# Patient Record
Sex: FEMALE | Race: WHITE | NOT HISPANIC OR LATINO | Employment: OTHER | URBAN - METROPOLITAN AREA
[De-identification: names, ages, dates, MRNs, and addresses within clinical notes are randomized per-mention and may not be internally consistent; named-entity substitution may affect disease eponyms.]

---

## 2017-01-13 ENCOUNTER — APPOINTMENT (OUTPATIENT)
Dept: PHYSICAL THERAPY | Facility: REHABILITATION | Age: 62
End: 2017-01-13
Payer: COMMERCIAL

## 2017-01-24 ENCOUNTER — HOSPITAL ENCOUNTER (OUTPATIENT)
Dept: RADIOLOGY | Facility: MEDICAL CENTER | Age: 62
End: 2017-01-24
Attending: FAMILY MEDICINE
Payer: COMMERCIAL

## 2017-01-24 DIAGNOSIS — M25.562 LEFT KNEE PAIN, UNSPECIFIED CHRONICITY: ICD-10-CM

## 2017-01-24 DIAGNOSIS — S83.412A SPRAIN OF MEDIAL COLLATERAL LIGAMENT OF LEFT KNEE, INITIAL ENCOUNTER: ICD-10-CM

## 2017-01-24 PROCEDURE — 73721 MRI JNT OF LWR EXTRE W/O DYE: CPT | Mod: LT

## 2017-02-16 ENCOUNTER — OFFICE VISIT (OUTPATIENT)
Dept: NEUROLOGY | Facility: MEDICAL CENTER | Age: 62
End: 2017-02-16
Payer: COMMERCIAL

## 2017-02-16 VITALS
SYSTOLIC BLOOD PRESSURE: 110 MMHG | OXYGEN SATURATION: 91 % | BODY MASS INDEX: 34.06 KG/M2 | HEIGHT: 67 IN | DIASTOLIC BLOOD PRESSURE: 76 MMHG | HEART RATE: 79 BPM | WEIGHT: 217 LBS | TEMPERATURE: 99 F

## 2017-02-16 DIAGNOSIS — G35 MS (MULTIPLE SCLEROSIS) (HCC): ICD-10-CM

## 2017-02-16 DIAGNOSIS — M54.50 LOW BACK PAIN ASSOCIATED WITH A SPINAL DISORDER OTHER THAN RADICULOPATHY OR SPINAL STENOSIS: ICD-10-CM

## 2017-02-16 PROCEDURE — 99213 OFFICE O/P EST LOW 20 MIN: CPT

## 2017-02-16 RX ORDER — TRAMADOL HYDROCHLORIDE 50 MG/1
50 TABLET ORAL EVERY 4 HOURS PRN
COMMUNITY
End: 2018-01-16

## 2017-02-16 NOTE — PROGRESS NOTES
Neurology Progress Note  2/16/2017      Referring MD:  Dr. Jacobo  Patient ID:  Name:             Bharti Zabala   YOB: 1955  Age:                 62 y.o.  female   MRN:               [id number]                                              Reason for Consult:      Follow-up of multiple sclerosis and low back pain    History of Present Illness:    This 62-year-old lady is stable multiple sclerosis with 40 mg Copaxone every third day and has just had some knee injections because of pain in the left knee. She is scheduled to have an epidural steroid on March 3 after appropriate clearance by her endocrinologist and other specialists. She's not had any new symptoms from her multiple sclerosis and is tolerating the Copaxone quite well.    Review of Systems: Relates to her low back pain primarily.  Constitutional: Denies fevers, Denies weight changes  Eyes: Denies changes in vision, no eye pain  Ears/Nose/Throat/Mouth: Denies nasal congestion or sore throat   Cardiovascular: Denies chest pain, Denies palpitations   Respiratory: Denies shortness of breath , Denies cough  Gastrointestinal/Hepatic: Denies abdominal pain, nausea, vomiting, diarrhea, constipation or GI bleeding   Genitourinary: Denies dysuria or frequency  Musculoskeletal/Rheum: Denies  joint pain and swelling, No edema  Skin: Denies rash  Neurological: Denies headache, confusion, memory loss or focal weakness/parasthesias  Psychiatric: denies mood disorder   Endocrine: Argelia thyroid problems  Heme/Oncology/Lymph Nodes: Denies enlarged lymph nodes, denies brusing or known bleeding disorder  All other systems were reviewed and are negative (AMA/CMS criteria)                Past Medical History:     Past Medical History   Diagnosis Date   • MS (multiple sclerosis) (CMS-Former[facility])        Problems addressed this visit:    Problem List Items Addressed This Visit     MS (multiple sclerosis) (CMS-Formerly [facility])      Other Visit Diagnoses     Low back pain  "associated with a spinal disorder other than radiculopathy or spinal stenosis         Relevant Medications     tramadol (ULTRAM) 50 MG Tab           Past Surgical History:   Past Surgical History   Procedure Laterality Date   • Pr cholecystoenterostomy           Current Outpatient Medications:  Current Outpatient Prescriptions   Medication   • tramadol (ULTRAM) 50 MG Tab   • Cranberry 450 MG Cap   • methenamine hip (HIPPREX) 1 GM Tab   • Glatiramer Acetate (COPAXONE) 40 MG/ML Solution Prefilled Syringe   • oxybutynin SR (DITROPAN-XL) 5 MG TB24   • atorvastatin (LIPITOR) 20 MG TABS   • predniSONE (DELTASONE) 10 MG TABS   • vitamin D (CHOLECALCIFEROL) 1000 UNIT TABS   • VITAMIN K PO   • Vitamin D-Vitamin K (K2 PLUS D3) 100-1000 MCG-UNIT Tab   • amoxicillin (AMOXIL) 250 MG Cap   • tetracycline (ACHROMYCIN, SUMYCIN) 500 MG Cap   • cyanocobalamin (VITAMIN B-12) 1000 MCG/ML SOLN   • POTASSIUM PO   • glatiramer (COPAXONE) 20 MG/ML KIT   • nitrofurantoin monohydr macro (MACROBID) 100 MG CAPS     No current facility-administered medications for this visit.       Medication Allergy:  Allergies   Allergen Reactions   • Sulfa Drugs Rash             Family History:  Family History   Problem Relation Age of Onset   • Cancer Other        Social History:  Social History     Social History   • Marital Status:      Spouse Name: N/A   • Number of Children: N/A   • Years of Education: N/A     Occupational History   • Not on file.     Social History Main Topics   • Smoking status: Never Smoker    • Smokeless tobacco: Not on file   • Alcohol Use: No   • Drug Use: No   • Sexual Activity: Not on file     Other Topics Concern   • Not on file     Social History Narrative       Physical Exam:  Vitals:   Filed Vitals:    02/16/17 1252   BP: 110/76   Pulse: 79   Temp: 37.2 °C (99 °F)   Height: 1.702 m (5' 7.01\")   Weight: 98.431 kg (217 lb)   SpO2: 91%     General Appearance: She is well-developed well-nourished lady. She is to walk and " carefully because of a knee brace on the left knee but otherwise her strength is fairly good and she is some decreased pinprick in the left body side which is been a stable finding from her multiple sclerosis.  Weight/BMI: Body mass index is 33.98 kg/(m^2).      Eyes:  Normal optic discs: Yes  Visual field: Normal  Pupillary responses: Normal  Extraocular movement: Normal    Cardiovascular:  Normal carotid pulses bilaterally: Yes  RRR with no MRGs: Yes  No peripheral edema, pulses intact: Yes    Musculoskeletal:  Normal gait and station: Yes  Normal muscle strength: Yes  Normal muscle tone, no atrophy: Yes  No abnormal movements: Yes    Plan:   Since her MS is relatively stable and back pain is going to be dealt with by physiatry and possibly spine surgery after the effect of the epidural is known I will plan on seeing her in 6 months. His free to call if any thing else is needed before that time.    Total length of time of this visit was 20 minutes of which greater than 50% was spent counseling the patient/family and coordinating care.    Thank you for allowing me to participate in his care.    Sincerely yours,        Silas Wang MD, PhD

## 2017-02-16 NOTE — MR AVS SNAPSHOT
"        Bharti Zabala   2017 1:00 PM   Office Visit   MRN: [id number]    Department:  [facility]   Dept Phone:  [phone]    Description:  Female : 1955   Provider:  Silas Wang M.D.           Reason for Visit     Follow-Up back pain associated with peripheral numbness      Allergies as of 2017     Allergen Noted Reactions    Sulfa Drugs 2014   Rash           You were diagnosed with     MS (multiple sclerosis) (CMS-HCC)   [906743]       Low back pain associated with a spinal disorder other than radiculopathy or spinal stenosis   [1381233]         Vital Signs     Blood Pressure Pulse Temperature Height Weight Body Mass Index    110/76 mmHg 79 37.2 °C (99 °F) 1.702 m (5' 7.01\") 98.431 kg (217 lb) 33.98 kg/m2    Oxygen Saturation Smoking Status                91% Never Smoker           Basic Information     Date Of Birth Sex Race Ethnicity Preferred Language    1955 Female White Non- English      Problem List              ICD-10-CM Priority Class Noted - Resolved    MS (multiple sclerosis) (CMS-HCC) G35   10/22/2012 - Present    Lower urinary tract infectious disease N39.0   10/22/2012 - Present    HYPOTENSION    10/23/2012 - Present    Flu syndrome J11.1   2014 - Present      Health Maintenance        Date Due Completion Dates    IMM DTaP/Tdap/Td Vaccine (1 - Tdap) 1974 ---    PAP SMEAR 1976 ---    COLONOSCOPY 2005 ---    IMM ZOSTER VACCINE 2015 ---    IMM INFLUENZA (1) 2016 ---    MAMMOGRAM 2017, 2015, 2014, 2013, 2011, 10/3/2008, 10/3/2008, 2007, 2007            Current Immunizations     No immunizations on file.      Below and/or attached are the medications your provider expects you to take. Review all of your home medications and newly ordered medications with your provider and/or pharmacist. Follow medication instructions as directed by your provider and/or pharmacist. Please keep your " medication list with you and share with your provider. Update the information when medications are discontinued, doses are changed, or new medications (including over-the-counter products) are added; and carry medication information at all times in the event of emergency situations     Allergies:  SULFA DRUGS - Rash               Medications  Valid as of: February 16, 2017 -  1:14 PM    Generic Name Brand Name Tablet Size Instructions for use    Amoxicillin (Cap) AMOXIL 250 MG         Atorvastatin Calcium (Tab) LIPITOR 20 MG Take 20 mg by mouth every evening.        Cholecalciferol (Tab) cholecalciferol 1000 UNIT Take 5,000 Units by mouth every bedtime.        Cranberry (Cap) Cranberry 450 MG Take  by mouth.        Cyanocobalamin (Solution) VITAMIN B-12 1000 MCG/ML 1,000 mcg by Intramuscular route every 30 days.        Glatiramer Acetate (Kit) COPAXONE 20 MG/ML Inject 40 mg as instructed every Monday, Wednesday, and Friday.        Glatiramer Acetate (Solution Prefilled Syringe) Glatiramer Acetate 40 MG/ML Inject 40 mg as instructed every Monday, Wednesday, and Friday.        Methenamine Hippurate (Tab) HIPPREX 1 GM Take 1 g by mouth 2 Times a Day.        Nitrofurantoin Monohyd Macro (Cap) MACROBID 100 MG Take 100 mg by mouth every day.        Oxybutynin Chloride (TABLET SR 24 HR) DITROPAN-XL 5 MG Take 5 mg by mouth every morning.        Potassium   Take 1 Tab by mouth every day. Indications: **OTC**        PredniSONE (Tab) DELTASONE 10 MG Take 10 mg by mouth every day. Take with food         Tetracycline HCl (Cap) ACHROMYCIN, SUMYCIN 500 MG         TraMADol HCl (Tab) ULTRAM 50 MG Take 50 mg by mouth every four hours as needed.        Vitamin D-Vitamin K (Tab) K2 PLUS D3 100-1000 MCG-UNIT Take  by mouth.        Vitamin K   Take 100 mg by mouth every day.        .                 Medicines prescribed today were sent to:     [facility], [place] - [address]    [address] [place] [address]    Phone: [phone] Fax: [phone]    Open 24 Hours?: No    [facility] - [place], [place] - [address]    [address] [address]    Phone: [phone] Fax: [phone]    Open 24 Hours?: No      Medication refill instructions:       If your prescription bottle indicates you have medication refills left, it is not necessary to call your provider’s office. Please contact your pharmacy and they will refill your medication.    If your prescription bottle indicates you do not have any refills left, you may request refills at any time through one of the following ways: The online [employer] system (except Urgent Care), by calling your provider’s office, or by asking your pharmacy to contact your provider’s office with a refill request. Medication refills are processed only during regular business hours and may not be available until the next business day. Your provider may request additional information or to have a follow-up visit with you prior to refilling your medication.   *Please Note: Medication refills are assigned a new Rx number when refilled electronically. Your pharmacy may indicate that no refills were authorized even though a new prescription for the same medication is available at the pharmacy. Please request the medicine by name with the pharmacy before contacting your provider for a refill.           [employer] Access Code: Activation code not generated  Current [employer] Status: Active

## 2017-02-23 ENCOUNTER — TELEPHONE (OUTPATIENT)
Dept: NEUROLOGY | Facility: MEDICAL CENTER | Age: 62
End: 2017-02-23

## 2017-02-23 NOTE — TELEPHONE ENCOUNTER
Pt called again, [facility] has number for Shaq at [facility] [phone].

## 2017-02-23 NOTE — TELEPHONE ENCOUNTER
Pt called in today stating that spine [facility] will not do the procedure you ordered for her unless you call and speak with them directly today.   The phone number at [facility] is [phone]       Please Advise    Thanks Trinh CUELLAR

## 2017-05-10 DIAGNOSIS — G35 MULTIPLE SCLEROSIS (HCC): ICD-10-CM

## 2017-05-10 RX ORDER — GLATIRAMER ACETATE 40 MG/ML
INJECTION, SOLUTION SUBCUTANEOUS
Qty: 12 ML | Refills: 12 | Status: SHIPPED | OUTPATIENT
Start: 2017-05-10 | End: 2019-02-27 | Stop reason: SDUPTHER

## 2017-05-19 DIAGNOSIS — Z01.812 PRE-OPERATIVE LABORATORY EXAMINATION: ICD-10-CM

## 2017-05-19 DIAGNOSIS — Z01.810 PRE-OPERATIVE CARDIOVASCULAR EXAMINATION: ICD-10-CM

## 2017-05-19 LAB
ANION GAP SERPL CALC-SCNC: 6 MMOL/L (ref 0–11.9)
APPEARANCE UR: ABNORMAL
BACTERIA #/AREA URNS HPF: ABNORMAL /HPF
BILIRUB UR QL STRIP.AUTO: NEGATIVE
BUN SERPL-MCNC: 17 MG/DL (ref 8–22)
CALCIUM SERPL-MCNC: 9.7 MG/DL (ref 8.5–10.5)
CAOX CRY #/AREA URNS HPF: ABNORMAL /HPF
CHLORIDE SERPL-SCNC: 105 MMOL/L (ref 96–112)
CO2 SERPL-SCNC: 29 MMOL/L (ref 20–33)
COLOR UR: YELLOW
CREAT SERPL-MCNC: 0.75 MG/DL (ref 0.5–1.4)
CULTURE IF INDICATED INDCX: YES UA CULTURE
EKG IMPRESSION: NORMAL
EPI CELLS #/AREA URNS HPF: ABNORMAL /HPF
ERYTHROCYTE [DISTWIDTH] IN BLOOD BY AUTOMATED COUNT: 45.4 FL (ref 35.9–50)
GFR SERPL CREATININE-BSD FRML MDRD: >60 ML/MIN/1.73 M 2
GLUCOSE SERPL-MCNC: 113 MG/DL (ref 65–99)
GLUCOSE UR STRIP.AUTO-MCNC: NEGATIVE MG/DL
HCT VFR BLD AUTO: 43.4 % (ref 37–47)
HGB BLD-MCNC: 14 G/DL (ref 12–16)
KETONES UR STRIP.AUTO-MCNC: NEGATIVE MG/DL
LEUKOCYTE ESTERASE UR QL STRIP.AUTO: NEGATIVE
MCH RBC QN AUTO: 28.9 PG (ref 27–33)
MCHC RBC AUTO-ENTMCNC: 32.3 G/DL (ref 33.6–35)
MCV RBC AUTO: 89.5 FL (ref 81.4–97.8)
MICRO URNS: ABNORMAL
MUCOUS THREADS #/AREA URNS HPF: ABNORMAL /HPF
NITRITE UR QL STRIP.AUTO: POSITIVE
PH UR STRIP.AUTO: 6 [PH]
PLATELET # BLD AUTO: 222 K/UL (ref 164–446)
PMV BLD AUTO: 11.3 FL (ref 9–12.9)
POTASSIUM SERPL-SCNC: 4.5 MMOL/L (ref 3.6–5.5)
PROT UR QL STRIP: NEGATIVE MG/DL
RBC # BLD AUTO: 4.85 M/UL (ref 4.2–5.4)
RBC # URNS HPF: ABNORMAL /HPF
RBC UR QL AUTO: NEGATIVE
SCCMEC + MECA PNL NOSE NAA+PROBE: NEGATIVE
SCCMEC + MECA PNL NOSE NAA+PROBE: NEGATIVE
SODIUM SERPL-SCNC: 140 MMOL/L (ref 135–145)
SP GR UR STRIP.AUTO: 1.02
WBC # BLD AUTO: 12.4 K/UL (ref 4.8–10.8)
WBC #/AREA URNS HPF: ABNORMAL /HPF

## 2017-05-19 PROCEDURE — 87641 MR-STAPH DNA AMP PROBE: CPT

## 2017-05-19 PROCEDURE — 87640 STAPH A DNA AMP PROBE: CPT

## 2017-05-19 PROCEDURE — 85027 COMPLETE CBC AUTOMATED: CPT

## 2017-05-19 PROCEDURE — 80048 BASIC METABOLIC PNL TOTAL CA: CPT

## 2017-05-19 PROCEDURE — 83036 HEMOGLOBIN GLYCOSYLATED A1C: CPT

## 2017-05-19 PROCEDURE — 36415 COLL VENOUS BLD VENIPUNCTURE: CPT

## 2017-05-19 PROCEDURE — 87186 SC STD MICRODIL/AGAR DIL: CPT

## 2017-05-19 PROCEDURE — 93005 ELECTROCARDIOGRAM TRACING: CPT

## 2017-05-19 PROCEDURE — 87077 CULTURE AEROBIC IDENTIFY: CPT

## 2017-05-19 PROCEDURE — 93010 ELECTROCARDIOGRAM REPORT: CPT | Performed by: INTERNAL MEDICINE

## 2017-05-19 PROCEDURE — 81001 URINALYSIS AUTO W/SCOPE: CPT

## 2017-05-19 PROCEDURE — 87086 URINE CULTURE/COLONY COUNT: CPT

## 2017-05-19 NOTE — DISCHARGE PLANNING
DISCHARGE PLANNING NOTE - TOTAL JOINT     Procedure: Procedure(s):  KNEE UNICOMPARTMENTAL - MEDIAL [place]  Procedure Date: 5/23/2017  Insurance:  Payor: [employer] / [employer] / Plan: [employer] / [employer] / Product Type: *No Product type* /   Equipment currently available at home? none at this time.  will get fww if needed  Steps into the home? 3  Steps within the home? 0  Toilet height? ADA  Type of shower? walk-in shower  Who will be with you during your recovery? spouse  Is Outpatient Physical Therapy set up after surgery? No   Did you take the Total Joint Class and where? No     Plan: Met pt in pre-admit.  Unicompartmental knee, with plans to dc home as outpt.  She has a pre-op apt on Monday 5/22 to set up PT and DME if needed.  She has a seat in shower.  No questions or DME needs at this time.

## 2017-05-19 NOTE — OR NURSING
Pre admit apt: Pt. Instructed to continue regularly prescribed medications through day before surgery.  Instructed to take the following medications, the day of surgery, with a sip of water per anesthesia protocol:methenamine, ultram, ditropan

## 2017-05-21 LAB
BACTERIA UR CULT: ABNORMAL
BACTERIA UR CULT: ABNORMAL
SIGNIFICANT IND 70042: ABNORMAL
SITE SITE: ABNORMAL
SOURCE SOURCE: ABNORMAL

## 2017-05-22 LAB
EST. AVERAGE GLUCOSE BLD GHB EST-MCNC: 123 MG/DL
HBA1C MFR BLD: 5.9 % (ref 0–5.6)

## 2017-05-23 ENCOUNTER — HOSPITAL ENCOUNTER (OUTPATIENT)
Facility: MEDICAL CENTER | Age: 62
End: 2017-05-23
Attending: ORTHOPAEDIC SURGERY | Admitting: ORTHOPAEDIC SURGERY
Payer: COMMERCIAL

## 2017-05-23 VITALS
TEMPERATURE: 97.2 F | SYSTOLIC BLOOD PRESSURE: 120 MMHG | OXYGEN SATURATION: 93 % | BODY MASS INDEX: 32.87 KG/M2 | DIASTOLIC BLOOD PRESSURE: 65 MMHG | HEART RATE: 75 BPM | HEIGHT: 67 IN | WEIGHT: 209.44 LBS | RESPIRATION RATE: 16 BRPM

## 2017-05-23 PROBLEM — M17.12 DEGENERATIVE ARTHRITIS OF LEFT KNEE: Status: ACTIVE | Noted: 2017-05-23

## 2017-05-23 LAB — GLUCOSE BLD-MCNC: 108 MG/DL (ref 65–99)

## 2017-05-23 PROCEDURE — G8980 MOBILITY D/C STATUS: HCPCS | Mod: CL

## 2017-05-23 PROCEDURE — 700105 HCHG RX REV CODE 258

## 2017-05-23 PROCEDURE — 700105 HCHG RX REV CODE 258: Performed by: ORTHOPAEDIC SURGERY

## 2017-05-23 PROCEDURE — 700111 HCHG RX REV CODE 636 W/ 250 OVERRIDE (IP)

## 2017-05-23 PROCEDURE — 500088 HCHG BLADE, SAGITTAL: Performed by: ORTHOPAEDIC SURGERY

## 2017-05-23 PROCEDURE — 160009 HCHG ANES TIME/MIN: Performed by: ORTHOPAEDIC SURGERY

## 2017-05-23 PROCEDURE — 160046 HCHG PACU - 1ST 60 MINS PHASE II: Performed by: ORTHOPAEDIC SURGERY

## 2017-05-23 PROCEDURE — 82962 GLUCOSE BLOOD TEST: CPT

## 2017-05-23 PROCEDURE — 501838 HCHG SUTURE GENERAL: Performed by: ORTHOPAEDIC SURGERY

## 2017-05-23 PROCEDURE — 500865 HCHG NEEDLE, SPINAL 20G/22G: Performed by: ORTHOPAEDIC SURGERY

## 2017-05-23 PROCEDURE — A9270 NON-COVERED ITEM OR SERVICE: HCPCS

## 2017-05-23 PROCEDURE — 500087 HCHG BLADE, RECIPROCATING: Performed by: ORTHOPAEDIC SURGERY

## 2017-05-23 PROCEDURE — 502240 HCHG MISC OR SUPPLY RC 0272: Performed by: ORTHOPAEDIC SURGERY

## 2017-05-23 PROCEDURE — 501486 HCHG STRYKER IRRIG SET HC W/TUBING: Performed by: ORTHOPAEDIC SURGERY

## 2017-05-23 PROCEDURE — 97161 PT EVAL LOW COMPLEX 20 MIN: CPT

## 2017-05-23 PROCEDURE — 160029 HCHG SURGERY MINUTES - 1ST 30 MINS LEVEL 4: Performed by: ORTHOPAEDIC SURGERY

## 2017-05-23 PROCEDURE — 700101 HCHG RX REV CODE 250

## 2017-05-23 PROCEDURE — 501480 HCHG STOCKINETTE: Performed by: ORTHOPAEDIC SURGERY

## 2017-05-23 PROCEDURE — 501487 HCHG STRYKER TIP: Performed by: ORTHOPAEDIC SURGERY

## 2017-05-23 PROCEDURE — 160041 HCHG SURGERY MINUTES - EA ADDL 1 MIN LEVEL 4: Performed by: ORTHOPAEDIC SURGERY

## 2017-05-23 PROCEDURE — G8979 MOBILITY GOAL STATUS: HCPCS | Mod: CL

## 2017-05-23 PROCEDURE — L1830 KO IMMOB CANVAS LONG PRE OTS: HCPCS | Performed by: ORTHOPAEDIC SURGERY

## 2017-05-23 PROCEDURE — 500093 HCHG BONE CEMENT MIXER: Performed by: ORTHOPAEDIC SURGERY

## 2017-05-23 PROCEDURE — 302135 SEQUENTIAL COMPRESSION MACHINE: Performed by: ORTHOPAEDIC SURGERY

## 2017-05-23 PROCEDURE — 500367 HCHG DRAIN KIT, HEMOVAC: Performed by: ORTHOPAEDIC SURGERY

## 2017-05-23 PROCEDURE — 502000 HCHG MISC OR IMPLANTS RC 0278: Performed by: ORTHOPAEDIC SURGERY

## 2017-05-23 PROCEDURE — 502579 HCHG PACK, TOTAL KNEE: Performed by: ORTHOPAEDIC SURGERY

## 2017-05-23 PROCEDURE — 160035 HCHG PACU - 1ST 60 MINS PHASE I: Performed by: ORTHOPAEDIC SURGERY

## 2017-05-23 PROCEDURE — G8978 MOBILITY CURRENT STATUS: HCPCS | Mod: CL

## 2017-05-23 PROCEDURE — 160048 HCHG OR STATISTICAL LEVEL 1-5: Performed by: ORTHOPAEDIC SURGERY

## 2017-05-23 PROCEDURE — 160002 HCHG RECOVERY MINUTES (STAT): Performed by: ORTHOPAEDIC SURGERY

## 2017-05-23 PROCEDURE — 160047 HCHG PACU  - EA ADDL 30 MINS PHASE II: Performed by: ORTHOPAEDIC SURGERY

## 2017-05-23 PROCEDURE — 500811 HCHG LENS/HOOD FOR SPACESUIT: Performed by: ORTHOPAEDIC SURGERY

## 2017-05-23 PROCEDURE — 700102 HCHG RX REV CODE 250 W/ 637 OVERRIDE(OP)

## 2017-05-23 PROCEDURE — 500423 HCHG DRESSING, ABD COMBINE: Performed by: ORTHOPAEDIC SURGERY

## 2017-05-23 PROCEDURE — 500096 HCHG BONE CEMENT, SIMPLEX ANTIBIOTIC: Performed by: ORTHOPAEDIC SURGERY

## 2017-05-23 PROCEDURE — 160036 HCHG PACU - EA ADDL 30 MINS PHASE I: Performed by: ORTHOPAEDIC SURGERY

## 2017-05-23 PROCEDURE — 160022 HCHG BLOCK: Performed by: ORTHOPAEDIC SURGERY

## 2017-05-23 PROCEDURE — 160025 RECOVERY II MINUTES (STATS): Performed by: ORTHOPAEDIC SURGERY

## 2017-05-23 DEVICE — CEMENT BONE SIMPLEX W/GENTAICIN (10/PK): Type: IMPLANTABLE DEVICE | Status: FUNCTIONAL

## 2017-05-23 DEVICE — IMPLANTABLE DEVICE: Type: IMPLANTABLE DEVICE | Status: FUNCTIONAL

## 2017-05-23 RX ORDER — MORPHINE SULFATE 10 MG/ML
INJECTION, SOLUTION INTRAMUSCULAR; INTRAVENOUS
Status: DISCONTINUED | OUTPATIENT
Start: 2017-05-23 | End: 2017-05-23 | Stop reason: HOSPADM

## 2017-05-23 RX ORDER — TRANEXAMIC ACID 100 MG/ML
INJECTION, SOLUTION INTRAVENOUS
Status: DISCONTINUED
Start: 2017-05-23 | End: 2017-05-23 | Stop reason: HOSPADM

## 2017-05-23 RX ORDER — LIDOCAINE HYDROCHLORIDE 10 MG/ML
INJECTION, SOLUTION INFILTRATION; PERINEURAL
Status: COMPLETED
Start: 2017-05-23 | End: 2017-05-23

## 2017-05-23 RX ORDER — CIPROFLOXACIN 500 MG/1
500 TABLET, FILM COATED ORAL 2 TIMES DAILY
COMMUNITY
End: 2019-01-17

## 2017-05-23 RX ORDER — BUPIVACAINE HYDROCHLORIDE AND EPINEPHRINE 5; 5 MG/ML; UG/ML
INJECTION, SOLUTION EPIDURAL; INTRACAUDAL; PERINEURAL
Status: DISCONTINUED | OUTPATIENT
Start: 2017-05-23 | End: 2017-05-23 | Stop reason: HOSPADM

## 2017-05-23 RX ORDER — OXYCODONE HCL 5 MG/5 ML
SOLUTION, ORAL ORAL
Status: COMPLETED
Start: 2017-05-23 | End: 2017-05-23

## 2017-05-23 RX ORDER — SODIUM CHLORIDE, SODIUM LACTATE, POTASSIUM CHLORIDE, CALCIUM CHLORIDE 600; 310; 30; 20 MG/100ML; MG/100ML; MG/100ML; MG/100ML
1000 INJECTION, SOLUTION INTRAVENOUS
Status: DISCONTINUED | OUTPATIENT
Start: 2017-05-23 | End: 2017-05-23 | Stop reason: HOSPADM

## 2017-05-23 RX ORDER — KETOROLAC TROMETHAMINE 30 MG/ML
INJECTION, SOLUTION INTRAMUSCULAR; INTRAVENOUS
Status: DISCONTINUED | OUTPATIENT
Start: 2017-05-23 | End: 2017-05-23 | Stop reason: HOSPADM

## 2017-05-23 RX ADMIN — VANCOMYCIN HYDROCHLORIDE 1 G: 1 INJECTION, POWDER, LYOPHILIZED, FOR SOLUTION INTRAVENOUS at 07:29

## 2017-05-23 RX ADMIN — SODIUM CHLORIDE, POTASSIUM CHLORIDE, SODIUM LACTATE AND CALCIUM CHLORIDE 1000 ML: 600; 310; 30; 20 INJECTION, SOLUTION INTRAVENOUS at 07:00

## 2017-05-23 RX ADMIN — LIDOCAINE HYDROCHLORIDE 0.1 ML: 10 INJECTION, SOLUTION INFILTRATION; PERINEURAL at 07:00

## 2017-05-23 RX ADMIN — CEFAZOLIN 1 G: 1 INJECTION, POWDER, FOR SOLUTION INTRAVENOUS at 12:05

## 2017-05-23 RX ADMIN — FENTANYL CITRATE 50 MCG: 50 INJECTION, SOLUTION INTRAMUSCULAR; INTRAVENOUS at 10:45

## 2017-05-23 RX ADMIN — OXYCODONE HYDROCHLORIDE 10 MG: 5 SOLUTION ORAL at 10:45

## 2017-05-23 ASSESSMENT — GAIT ASSESSMENTS
DISTANCE (FEET): 20
ASSISTIVE DEVICE: FRONT WHEEL WALKER
GAIT LEVEL OF ASSIST: CONTACT GUARD ASSIST

## 2017-05-23 ASSESSMENT — PAIN SCALES - GENERAL
PAINLEVEL_OUTOF10: 4
PAINLEVEL_OUTOF10: 5
PAINLEVEL_OUTOF10: 0
PAINLEVEL_OUTOF10: 3
PAINLEVEL_OUTOF10: 3
PAINLEVEL_OUTOF10: 5
PAINLEVEL_OUTOF10: 0
PAINLEVEL_OUTOF10: 5
PAINLEVEL_OUTOF10: 3

## 2017-05-23 NOTE — DISCHARGE INSTRUCTIONS
ACTIVITY: Rest and take it easy for the first 24 hours.  A responsible adult is recommended to remain with you during that time.  It is normal to feel sleepy.  We encourage you to not do anything that requires balance, judgment or coordination.    MILD FLU-LIKE SYMPTOMS ARE NORMAL. YOU MAY EXPERIENCE GENERALIZED MUSCLE ACHES, THROAT IRRITATION, HEADACHE AND/OR SOME NAUSEA.    FOR 24 HOURS DO NOT:  Drive, operate machinery or run household appliances.  Drink beer or alcoholic beverages.   Make important decisions or sign legal documents.    SPECIAL INSTRUCTIONS: ACTIVITY AS TOLERATED WITH WALKER.     DISCONTINUE DRAIN ON POST OP DAY #1 (WEDNSDAY) BY PATIENT OR HOME HEALTH NURSE.  DVT PROPHYLACTIC START XARELTO POST OP DAY # 1 (WEDNeSDAY).    1000 ANKLE PUMPS A DAY.    WEAR KNEE HIGH TOBI HOSE.    HOME HEALTH/ PHYSICAL THERAPY TO SEE PATIENT POST OP DAY # 1 (WEDNSDAY)  PROTEIN SHAKES 2 TIMES A DAY FOR 7 DAYS AFTER SURGERY.  REMOVE DRESSING POST OP DAY #3 (FRIDAY)  OK TO GET WET IN SHOWER. NO BATHING OR SOAKING/SUBMERGING INCISION.   KEEP OPEN TO AIR.  NO CREAMS OR SOLUTIONS.    2 ISLAND DRESSINGS WILL BE GIVEN TO PATIENT FOR HOME USE. APPLY IF ANY CONTINUED DRAINAGE AND IF SO CONTACT DR. YATES  CALL FOR FEVER, CHILLS, INCREASING PAIN OR SIGNS OF INFECTION OR DVT.          DIET: To avoid nausea, slowly advance diet as tolerated, avoiding spicy or greasy foods for the first day.  Add more substantial food to your diet according to your physician's instructions.  Babies can be fed formula or breast milk as soon as they are hungry.  INCREASE FLUIDS AND FIBER TO AVOID CONSTIPATION.      FOLLOW-UP APPOINTMENT:  A follow-up appointment should be arranged with your doctor; call to schedule.  CALL FOR FOLLOW UP APPOINTMENT.     You should CALL YOUR PHYSICIAN if you develop:  Fever greater than 101 degrees F.  Pain not relieved by medication, or persistent nausea or vomiting.  Excessive bleeding (blood soaking through  dressing) or unexpected drainage from the wound.  Extreme redness or swelling around the incision site, drainage of pus or foul smelling drainage.  Inability to urinate or empty your bladder within 8 hours.  Problems with breathing or chest pain.    You should call 911 if you develop problems with breathing or chest pain.  If you are unable to contact your doctor or surgical center, you should go to the nearest emergency room or urgent care center.      Physician's telephone #: DR. YATES  [phone]     If any questions arise, call your doctor.  If your doctor is not available, please feel free to call the Surgical Center at [phone].  The Center is open Monday through Friday from 7AM to 7PM.  You can also call the [employer] HOTLINE open 24 hours/day, 7 days/week and speak to a nurse at [phone], or toll free at [phone].    A registered nurse may call you a few days after your surgery to see how you are doing after your procedure.    MEDICATIONS: Resume taking daily medication.  Take prescribed pain medication with food.  If no medication is prescribed, you may take non-aspirin pain medication if needed.  PAIN MEDICATION CAN BE VERY CONSTIPATING.  Take a stool softener or laxative such as senokot, pericolace, or milk of magnesia if needed.    Prescription at home.  Last pain medication given at 0965    If your physician has prescribed pain medication that includes Acetaminophen (Tylenol), do not take additional Acetaminophen (Tylenol) while taking the prescribed medication.    Depression / Suicide Risk    As you are discharged from this [facility] facility, it is important to learn how to keep safe from harming yourself.    Recognize the warning signs:  · Abrupt changes in personality, positive or negative- including increase in energy   · Giving away possessions  · Change in eating patterns- significant weight changes-  positive or negative  · Change in sleeping patterns- unable to sleep or  sleeping all the time   · Unwillingness or inability to communicate  · Depression  · Unusual sadness, discouragement and loneliness  · Talk of wanting to die  · Neglect of personal appearance   · Rebelliousness- reckless behavior  · Withdrawal from people/activities they love  · Confusion- inability to concentrate     If you or a loved one observes any of these behaviors or has concerns about self-harm, here's what you can do:  · Talk about it- your feelings and reasons for harming yourself  · Remove any means that you might use to hurt yourself (examples: pills, rope, extension cords, firearm)  · Get professional help from the community (Mental Health, Substance Abuse, psychological counseling)  · Do not be alone:Call your Safe Contact- someone whom you trust who will be there for you.  · Call your local CRISIS HOTLINE 299-4977 or 334-514-7969  · Call your local Children's Mobile Crisis Response Team [place] (925) 221-8017 or www.[employer]  · Call the toll free National Suicide Prevention Hotlines   · National Suicide Prevention Lifeline 246-015-PSFT (8548)  National Hope Line Network 800-SUICIDE (493-6913)  ACTIVITY: Rest and take it easy for the first 24 hours.  A responsible adult is recommended to remain with you during that time.  It is normal to feel sleepy.  We encourage you to not do anything that requires balance, judgment or coordination.    MILD FLU-LIKE SYMPTOMS ARE NORMAL. YOU MAY EXPERIENCE GENERALIZED MUSCLE ACHES, THROAT IRRITATION, HEADACHE AND/OR SOME NAUSEA.    FOR 24 HOURS DO NOT:  Drive, operate machinery or run household appliances.  Drink beer or alcoholic beverages.   Make important decisions or sign legal documents.    SPECIAL INSTRUCTIONS: ***    DIET: To avoid nausea, slowly advance diet as tolerated, avoiding spicy or greasy foods for the first day.  Add more substantial food to your diet according to your physician's instructions.  Babies can be fed formula or breast milk  as soon as they are hungry.  INCREASE FLUIDS AND FIBER TO AVOID CONSTIPATION.    SURGICAL DRESSING/BATHING: ***    FOLLOW-UP APPOINTMENT:  A follow-up appointment should be arranged with your doctor in ; call to schedule.    You should CALL YOUR PHYSICIAN if you develop:  Fever greater than 101 degrees F.  Pain not relieved by medication, or persistent nausea or vomiting.  Excessive bleeding (blood soaking through dressing) or unexpected drainage from the wound.  Extreme redness or swelling around the incision site, drainage of pus or foul smelling drainage.  Inability to urinate or empty your bladder within 8 hours.  Problems with breathing or chest pain.    You should call 911 if you develop problems with breathing or chest pain.  If you are unable to contact your doctor or surgical center, you should go to the nearest emergency room or urgent care center.  Physician's telephone #: [phone]    If any questions arise, call your doctor.  If your doctor is not available, please feel free to call the Surgical Center at [phone].  The Center is open Monday through Friday from 7AM to 7PM.  You can also call the [employer] HOTLINE open 24 hours/day, 7 days/week and speak to a nurse at [phone], or toll free at [phone].    A registered nurse may call you a few days after your surgery to see how you are doing after your procedure.    MEDICATIONS: Resume taking daily medication.  Take prescribed pain medication with food.  If no medication is prescribed, you may take non-aspirin pain medication if needed.  PAIN MEDICATION CAN BE VERY CONSTIPATING.  Take a stool softener or laxative such as senokot, pericolace, or milk of magnesia if needed.    Prescription given for preoperatively.  Last pain medication given at 10:45 am 10mg oxycodone .    If your physician has prescribed pain medication that includes Acetaminophen (Tylenol), do not take additional Acetaminophen (Tylenol) while taking the prescribed  medication.    Depression / Suicide Risk    As you are discharged from this [facility] facility, it is important to learn how to keep safe from harming yourself.    Recognize the warning signs:  · Abrupt changes in personality, positive or negative- including increase in energy   · Giving away possessions  · Change in eating patterns- significant weight changes-  positive or negative  · Change in sleeping patterns- unable to sleep or sleeping all the time   · Unwillingness or inability to communicate  · Depression  · Unusual sadness, discouragement and loneliness  · Talk of wanting to die  · Neglect of personal appearance   · Rebelliousness- reckless behavior  · Withdrawal from people/activities they love  · Confusion- inability to concentrate     If you or a loved one observes any of these behaviors or has concerns about self-harm, here's what you can do:  · Talk about it- your feelings and reasons for harming yourself  · Remove any means that you might use to hurt yourself (examples: pills, rope, extension cords, firearm)  · Get professional help from the community (Mental Health, Substance Abuse, psychological counseling)  · Do not be alone:Call your Safe Contact- someone whom you trust who will be there for you.  · Call your local CRISIS HOTLINE 110-5749 or 542-172-9975  · Call your local Children's Mobile Crisis Response Team [place] (882) 809-2964 or www.[employer]  · Call the toll free National Suicide Prevention Hotlines   · National Suicide Prevention Lifeline 586-129-GRPB (1585)  National Hope Line Network 800-SUICIDE (996-4960)    Peripheral Nerve Block Discharge Instructions from Same Day Surgery and Inpatient :    What to Expect - Lower Extremity  · The block may cause you to experience numbness and weakness in your hip and thigh, thigh and knee or calf and foot on the same side as your surgery  · Numbness, tingling and / or weakness are all normal. For some people, this may be an  "unpleasant sensation  · These issues will be resolved when the local anesthetic wears off   · You may experience numbness and tingling in your thigh on the same side as your surgery if the block medicine was injected at your groin area  · Numbness will make it difficult to walk  · You may have problems with balance and walking so be very careful   · Follow your surgeon's direction regarding weight bearing on your surgical limb  · Be very careful with your numb limb  Precautions  · The numbness may affect your balance  · Be careful when walking or moving around  · Your leg may be weak: be very careful putting weight on it  · If your surgeon did not specify a time, you should not bear weight for 24 hours  · Be sure to ask for help when you need it  · It is better to have help than to fall and hurt yourself    · Protect the limb like a baby  · Beware of exposing your limb to extreme heat or cold or trauma  · The limb may be injured without you noticing because it is numb  · Keep the limb elevated whenever possible  · Do not sleep on the limb  · Change the position of the limb regularly  · Avoid putting pressure on your surgical limb  Pain Control  · The initial block on the day of surgery will make your extremity feel \"numb\"  · Any consecutive injection including prior to discharge from the hospital will make your extremity feel \"numb\"  · You may feel an aching or burning when the local anesthesia starts to wear off  · Take pain pills as prescribed by your surgeon  · Call your surgeon or anesthesiologist if you do not have adequate pain control  ·   "

## 2017-05-23 NOTE — IP AVS SNAPSHOT
5/23/2017    Bhartimike Gaticaamp Vj  [address] [facility] [address]    Dear Bharti:    [facility] wants to ensure your discharge home is safe and you or your loved ones have had all of your questions answered regarding your care after you leave the hospital.    Below is a list of resources and contact information should you have any questions regarding your hospital stay, follow-up instructions, or active medical symptoms.    Questions or Concerns Regarding… Contact   Medical Questions Related to Your Discharge  (7 days a week, 8am-5pm) Contact a Nurse Care Coordinator   [phone]   Medical Questions Not Related to Your Discharge  (24 hours a day / 7 days a week)  Contact the Nurse Health Line   [phone]    Medications or Discharge Instructions Refer to your discharge packet   or contact your [facility] Primary Care Provider   [phone]   Follow-up Appointment(s) Schedule your appointment via Zoomdata   or contact Scheduling [phone]   Billing Review your statement via Zoomdata  or contact Billing [phone]   Medical Records Review your records via Zoomdata   or contact Medical Records [phone]     You may receive a telephone call within two days of discharge. This call is to make certain you understand your discharge instructions and have the opportunity to have any questions answered. You can also easily access your medical information, test results and upcoming appointments via the Zoomdata free online health management tool. You can learn more and sign up at [email or web address]. For assistance setting up your Zoom[employer] account, please call [phone].    Once again, we want to ensure your discharge home is safe and that you have a clear understanding of any next steps in your care. If you have any questions or concerns, please do not hesitate to contact us, we are here for you. Thank you for choosing [facility] for your healthcare needs.    Sincerely,    Your [facility] Healthcare Team

## 2017-05-23 NOTE — OP REPORT
DATE OF SERVICE:  05/23/2017    PREOPERATIVE DIAGNOSIS:  Left knee severe grade IV medial compartment   osteoarthritis.    POSTOPERATIVE DIAGNOSIS:  Left knee severe grade IV medial compartment   osteoarthritis with patellar osteophytosis, normal ACL, normal lateral   compartment.    SURGICAL PROCEDURES:  1.  Left knee examination under anesthesia.  2.  Left knee arthrotomy with patellar chondroplasty.  3.  Left knee arthrotomy with unicompartmental arthroplasty, Dorado-type   medial compartment.    SURGEON:  Tim Horn MD    ASSISTANT SURGEON:  Renny Jean MD    ANESTHESIOLOGIST:  Saurav Rodarte MD    ANESTHESIA:  Preoperative adductor block was requested of him and he performed   this in preop hold using ultrasonic guidance.    INDICATIONS:  A 62-year-old female with severe medial compartment grade IV   osteoarthritis, a correctible varus deformity, no AP instability, no lateral   pain, normal lateral cartilage, and relatively normal patellofemoral joint.    The patient failed nonoperative care and opted for surgical intervention.    Examination under anesthesia reveals full range of motion, varus deformity   correctable to neutral, no AP instability.    DESCRIPTION OF PROCEDURE:  Patient was brought to the operating room, placed   supine on the OR table.  General anesthesia induced smoothly.  Time-out was   called confirming the correct patient, side, site, procedure, and patient   receiving preoperative antibiotics including Kefzol and vancomycin and   starting a course of ciprofloxacin, which was recommended by infectious   disease for chronic asymptomatic urinary tract infection.  Next, the left   lower extremity was placed into the Oxford leg muniz, was sterilely prepped   and draped.  After exsanguination with Esmarch, tourniquet was inflated to 250   mmHg.  A medial incision was made directly over the medial compartment from   midway portion of the patella to about 2 fingerbreadths distal to  the tibia.    This was taken through the retinaculum.  Next, all osteophytes were removed   from the periphery of the tibia and the medial femoral condyle and the   intercondylar notch.  The lateral compartment was noted to be normal with an   intact and robust ACL.  Following this, the femur was sized to a medium.  A #1   medium spoon was placed and then the tibial guide was placed.  An initial   vertical saw cut and the plane of motion was made just off the ACL insertion   and then always protecting the MCL, the oscillating saw cut was made.  It was   felt that 2 more millimeters of bone could be taken and this was done removing   the jasper and with this, the femoral jig set at 4 and 5 both sitting with the   knee flexed.  Thus, the flexion gap was adequate.  The tibia was sized to a   size C.  This gave good front-to-back and medial-to-lateral coverage.    Next, an intramedullary chace was placed into the femur.  The femoral jig was   then placed with the chace with the chace connector in the center of the medial   femoral condyle with the knee flexed between  degrees.  Two holes were   drilled.  The posterior femoral cut was made.  A 0 spigot was placed and then   reamed.  The flexion gap was noted to be 4 and the extension gap was noted to   be 1.  A 3 mm spigot was placed and this gave a nice balanced 4 and 4 flexion   and extension gap.  The femur after each reaming was rounded off using a   rongeur and all soft tissue was protected during reaming.  Following this, a   single peg femur followed by a C tibia was placed and this gave a nice even   balanced 4 flexion and 4 extension gap.  Next, the impingement guide was   placed on the femur.  This was reamed anterior steele and bottomed out.  Any   posterior osteophytes or bone was removed with the special osteotome and then   further debrided using a power rasp.    Next, the tibia was placed against the lateral wall cut keying off the   posterior tibia and pinned  into position.  The tibial saw cut was made using   the toothbrush saw making sure not to violate the anterior posterior cortex.    Next, the entire knee was thoroughly irrigated with dilute Betadine solution   using pulsatile lavage including the posterior aspect of the knee.  Most of   the medial meniscus was removed leaving just a slight rim to protect the MCL   and the MCL was protected at all stages of procedure.    Next, small drill holes were made in the hard eburnated bone of the femur.    The entire knee was thoroughly debrided with dilute Betadine solution.  All   surfaces were dried.  The final tibia was implanted in the standard fashion   again trying to keep most of the cement extruded anterior steele and then the   femur was hand pressurized into each of the holes and then impacted in a   standard position with good pressurization.  A 4 paddle was placed.  The knee   was brought into about 30 degrees short of full extension with posterior steele   pressure.  There was noted to be good continued extrusion of cement and good   pressurization of components.  Any of the visible cement was removed prior to   placement of the femur using a Linwood curette.  Cement was removed from the   posterior aspect of the tibial component.  After complete polymerization, the   paddle was removed.  Any cement in the posterior knee was removed using a   Linwood curette as well as along the MCL.  Next, again, a 4 paddle was used   and there was even 4 flexion and extension gap.  An anatomic 4 bearing was   then placed.  The knee could be maximally flexed.  There was noted to be no   rotation, no catching or spinning of the bearing all the way through a full   range of motion and no impingement with full extension.  Next, this trial was   removed.  Posterior capsule again was evaluated for any further cement and   there was noted to be none, as was the posterior aspect of the femoral   component.  Next, the entire posterior  capsule and base of PCL and ACL were   infiltrated with the Marcaine, Toradol, and morphine solution.  The final 4 mm   bearing was then snapped into position.  Knee was taken through range of   motion and was noted to be stable.  The entire knee was then thoroughly   irrigated with dilute Betadine solution once again.  Tourniquet was deflated.    Tranexamic acid 1 g was given.  Medium Hemovac drain was placed.  Extensor   mechanism was closed with a #2 Stratafix PDX suture with a watertight closure,   the subcutaneous tissue with 3-0 Vicryl, and then skin closed with a running   everting nylon suture.  The extensor mechanism and subcutaneous tissue as well   was infiltrated with Marcaine solution.  Tourniquet was deflated after the   bearing had been placed at 84 minutes.  A sterile dressing was applied   followed by a PolarCare unit and a knee immobilizer.  Patient was awakened in   the operating room and taken to recovery room in stable condition.    TOURNIQUET TIME:  84 minutes.    COMPLICATIONS:  None.    DISPOSITION:  To the recovery room, then likely home when stable.       ____________________________________     MD BRENDA RASHID / PEACE    DD:  05/23/2017 10:17:15  DT:  05/23/2017 11:16:58    D#:  [id number]  Job#:  [id number]    cc: TOBIAS HARLEY MD

## 2017-05-23 NOTE — OR NURSING
1140  Pt arrived to stage two via gurney. Pt denies pain and nausea at this time. Cap refill to left foot <3 seconds. Pt able to wiggle toes and  1155 Up to bathroom with help of CNA   1205 Pt back to recliner with help of CNA.  at chairside. Pt tolerating liquids at this time.   1215 Explained discharge instructions to pt and pts . Both express understanding.   1230 Physical therapy at chairside per Dr. Horn. VSS  1300 Pt meets criteria to be discharged home after uneventful stay in stage two. Discharged via wheelchair by CNA.

## 2017-05-23 NOTE — OR SURGEON
Immediate Post-Operative Note      PreOp Diagnosis: left knee med comp oa.  Patellar osteophytes    PostOp Diagnosis: same    Procedure(s):  KNEE UNICOMPARTMENTAL - MEDIAL [place] - Wound Class: Clean with Drain    Surgeon(s):  MARII Douglass M.D.    Anesthesiologist/Type of Anesthesia:  Anesthesiologist: Saurav Rodarte M.D./General    Surgical Staff:  Circulator: Keanu Kauffman R.N.  Relief Circulator: Aly Hylton R.N.  Scrub Person: Nishant Ramirez    Specimen: none    Estimated Blood Loss: 50cc    Findings: severe grad 4 oa med comp.  Normal acl lateral comp    Complications: none   Tourniquet time 84 minutes        5/23/2017 10:02 AM Tim Horn

## 2017-05-23 NOTE — IP AVS SNAPSHOT
" Home Care Instructions                                                                                                                Name:Bharti Zabala  Medical Record Number:[id number]  CSN: [id number]    YOB: 1955   Age: 62 y.o.  Sex: female  HT:1.714 m (5' 7.48\") WT: 95 kg (209 lb 7 oz)          Admit Date: 5/23/2017     Discharge Date:   Today's Date: 5/23/2017  Attending Doctor:  Tim Horn M.D.                  Allergies:  Sulfa drugs                Discharge Instructions         ACTIVITY: Rest and take it easy for the first 24 hours.  A responsible adult is recommended to remain with you during that time.  It is normal to feel sleepy.  We encourage you to not do anything that requires balance, judgment or coordination.    MILD FLU-LIKE SYMPTOMS ARE NORMAL. YOU MAY EXPERIENCE GENERALIZED MUSCLE ACHES, THROAT IRRITATION, HEADACHE AND/OR SOME NAUSEA.    FOR 24 HOURS DO NOT:  Drive, operate machinery or run household appliances.  Drink beer or alcoholic beverages.   Make important decisions or sign legal documents.    SPECIAL INSTRUCTIONS: ACTIVITY AS TOLERATED WITH WALKER.     DISCONTINUE DRAIN ON POST OP DAY #1 (WEDNSDAY) BY PATIENT OR HOME HEALTH NURSE.  DVT PROPHYLACTIC START XARELTO POST OP DAY # 1 (WEDNeSDAY).    1000 ANKLE PUMPS A DAY.    WEAR KNEE HIGH TOBI HOSE.    HOME HEALTH/ PHYSICAL THERAPY TO SEE PATIENT POST OP DAY # 1 (WEDNSDAY)  PROTEIN SHAKES 2 TIMES A DAY FOR 7 DAYS AFTER SURGERY.  REMOVE DRESSING POST OP DAY #3 (FRIDAY)  OK TO GET WET IN SHOWER. NO BATHING OR SOAKING/SUBMERGING INCISION.   KEEP OPEN TO AIR.  NO CREAMS OR SOLUTIONS.    2 ISLAND DRESSINGS WILL BE GIVEN TO PATIENT FOR HOME USE. APPLY IF ANY CONTINUED DRAINAGE AND IF SO CONTACT DR. HORN  CALL FOR FEVER, CHILLS, INCREASING PAIN OR SIGNS OF INFECTION OR DVT.          DIET: To avoid nausea, slowly advance diet as tolerated, avoiding spicy or greasy foods for the first day.  Add more substantial food to your " diet according to your physician's instructions.  Babies can be fed formula or breast milk as soon as they are hungry.  INCREASE FLUIDS AND FIBER TO AVOID CONSTIPATION.      FOLLOW-UP APPOINTMENT:  A follow-up appointment should be arranged with your doctor; call to schedule.  CALL FOR FOLLOW UP APPOINTMENT.     You should CALL YOUR PHYSICIAN if you develop:  Fever greater than 101 degrees F.  Pain not relieved by medication, or persistent nausea or vomiting.  Excessive bleeding (blood soaking through dressing) or unexpected drainage from the wound.  Extreme redness or swelling around the incision site, drainage of pus or foul smelling drainage.  Inability to urinate or empty your bladder within 8 hours.  Problems with breathing or chest pain.    You should call 911 if you develop problems with breathing or chest pain.  If you are unable to contact your doctor or surgical center, you should go to the nearest emergency room or urgent care center.      Physician's telephone #: DR. YATES  [phone]     If any questions arise, call your doctor.  If your doctor is not available, please feel free to call the Surgical Center at [phone].  The Center is open Monday through Friday from 7AM to 7PM.  You can also call the [employer] HOTLINE open 24 hours/day, 7 days/week and speak to a nurse at [phone], or toll free at [phone].    A registered nurse may call you a few days after your surgery to see how you are doing after your procedure.    MEDICATIONS: Resume taking daily medication.  Take prescribed pain medication with food.  If no medication is prescribed, you may take non-aspirin pain medication if needed.  PAIN MEDICATION CAN BE VERY CONSTIPATING.  Take a stool softener or laxative such as senokot, pericolace, or milk of magnesia if needed.    Prescription at home.  Last pain medication given at 1045    If your physician has prescribed pain medication that includes Acetaminophen (Tylenol), do not take  additional Acetaminophen (Tylenol) while taking the prescribed medication.    Depression / Suicide Risk    As you are discharged from this [facility] facility, it is important to learn how to keep safe from harming yourself.    Recognize the warning signs:  · Abrupt changes in personality, positive or negative- including increase in energy   · Giving away possessions  · Change in eating patterns- significant weight changes-  positive or negative  · Change in sleeping patterns- unable to sleep or sleeping all the time   · Unwillingness or inability to communicate  · Depression  · Unusual sadness, discouragement and loneliness  · Talk of wanting to die  · Neglect of personal appearance   · Rebelliousness- reckless behavior  · Withdrawal from people/activities they love  · Confusion- inability to concentrate     If you or a loved one observes any of these behaviors or has concerns about self-harm, here's what you can do:  · Talk about it- your feelings and reasons for harming yourself  · Remove any means that you might use to hurt yourself (examples: pills, rope, extension cords, firearm)  · Get professional help from the community (Mental Health, Substance Abuse, psychological counseling)  · Do not be alone:Call your Safe Contact- someone whom you trust who will be there for you.  · Call your local CRISIS HOTLINE 928-4493 or 433-174-9974  · Call your local Children's Mobile Crisis Response Team [place] (842) 904-5851 or www.[employer]  · Call the toll free National Suicide Prevention Hotlines   · National Suicide Prevention Lifeline 818-057-WWSB (3843)  National Hope Line Network 800-SUICIDE (722-2251)  ACTIVITY: Rest and take it easy for the first 24 hours.  A responsible adult is recommended to remain with you during that time.  It is normal to feel sleepy.  We encourage you to not do anything that requires balance, judgment or coordination.    MILD FLU-LIKE SYMPTOMS ARE NORMAL. YOU MAY EXPERIENCE  GENERALIZED MUSCLE ACHES, THROAT IRRITATION, HEADACHE AND/OR SOME NAUSEA.    FOR 24 HOURS DO NOT:  Drive, operate machinery or run household appliances.  Drink beer or alcoholic beverages.   Make important decisions or sign legal documents.    SPECIAL INSTRUCTIONS: ***    DIET: To avoid nausea, slowly advance diet as tolerated, avoiding spicy or greasy foods for the first day.  Add more substantial food to your diet according to your physician's instructions.  Babies can be fed formula or breast milk as soon as they are hungry.  INCREASE FLUIDS AND FIBER TO AVOID CONSTIPATION.    SURGICAL DRESSING/BATHING: ***    FOLLOW-UP APPOINTMENT:  A follow-up appointment should be arranged with your doctor in ***; call to schedule.    You should CALL YOUR PHYSICIAN if you develop:  Fever greater than 101 degrees F.  Pain not relieved by medication, or persistent nausea or vomiting.  Excessive bleeding (blood soaking through dressing) or unexpected drainage from the wound.  Extreme redness or swelling around the incision site, drainage of pus or foul smelling drainage.  Inability to urinate or empty your bladder within 8 hours.  Problems with breathing or chest pain.    You should call 911 if you develop problems with breathing or chest pain.  If you are unable to contact your doctor or surgical center, you should go to the nearest emergency room or urgent care center.  Physician's telephone #: ***    If any questions arise, call your doctor.  If your doctor is not available, please feel free to call the Surgical Center at {Surgical Dept Numbers:[id number]}.  The Center is open Monday through Friday from 7AM to 7PM.  You can also call the HEALTH HOTLINE open 24 hours/day, 7 days/week and speak to a nurse at [phone], or toll free at [phone].    A registered nurse may call you a few days after your surgery to see how you are doing after your procedure.    MEDICATIONS: Resume taking daily medication.  Take prescribed pain  medication with food.  If no medication is prescribed, you may take non-aspirin pain medication if needed.  PAIN MEDICATION CAN BE VERY CONSTIPATING.  Take a stool softener or laxative such as senokot, pericolace, or milk of magnesia if needed.    Prescription given for ***.  Last pain medication given at ***.    If your physician has prescribed pain medication that includes Acetaminophen (Tylenol), do not take additional Acetaminophen (Tylenol) while taking the prescribed medication.    Depression / Suicide Risk    As you are discharged from this [facility] facility, it is important to learn how to keep safe from harming yourself.    Recognize the warning signs:  · Abrupt changes in personality, positive or negative- including increase in energy   · Giving away possessions  · Change in eating patterns- significant weight changes-  positive or negative  · Change in sleeping patterns- unable to sleep or sleeping all the time   · Unwillingness or inability to communicate  · Depression  · Unusual sadness, discouragement and loneliness  · Talk of wanting to die  · Neglect of personal appearance   · Rebelliousness- reckless behavior  · Withdrawal from people/activities they love  · Confusion- inability to concentrate     If you or a loved one observes any of these behaviors or has concerns about self-harm, here's what you can do:  · Talk about it- your feelings and reasons for harming yourself  · Remove any means that you might use to hurt yourself (examples: pills, rope, extension cords, firearm)  · Get professional help from the community (Mental Health, Substance Abuse, psychological counseling)  · Do not be alone:Call your Safe Contact- someone whom you trust who will be there for you.  · Call your local CRISIS HOTLINE 631-1834 or 346-873-2080  · Call your local Children's Mobile Crisis Response Team [place] (272) 746-2386 or www.[employer]  · Call the toll free National Suicide Prevention Hotlines  "  · National Suicide Prevention Lifeline 555-874-CYEE (3413)  [facility] 800-SUICIDE (424-0949)    Peripheral Nerve Block Discharge Instructions from Same Day Surgery and Inpatient :    What to Expect - Lower Extremity  · The block may cause you to experience numbness and weakness in your {LEG LOCATION PNB:840342} on the same side as your surgery  · Numbness, tingling and / or weakness are all normal. For some people, this may be an unpleasant sensation  · These issues will be resolved when the local anesthetic wears off   · You may experience numbness and tingling in your thigh on the same side as your surgery if the block medicine was injected at your groin area  · Numbness will make it difficult to walk  · You may have problems with balance and walking so be very careful   · Follow your surgeon's direction regarding weight bearing on your surgical limb  · Be very careful with your numb limb  Precautions  · The numbness may affect your balance  · Be careful when walking or moving around  · Your leg may be weak: be very careful putting weight on it  · If your surgeon did not specify a time, you should not bear weight for 24 hours  · Be sure to ask for help when you need it  · It is better to have help than to fall and hurt yourself    · Protect the limb like a baby  · Beware of exposing your limb to extreme heat or cold or trauma  · The limb may be injured without you noticing because it is numb  · Keep the limb elevated whenever possible  · Do not sleep on the limb  · Change the position of the limb regularly  · Avoid putting pressure on your surgical limb  Pain Control  · The initial block on the day of surgery will make your extremity feel \"numb\"  · Any consecutive injection including prior to discharge from the hospital will make your extremity feel \"numb\"  · You may feel an aching or burning when the local anesthesia starts to wear off  · Take pain pills as prescribed by your surgeon  · Call " your surgeon or anesthesiologist if you do not have adequate pain control  ·        Medication List      CONTINUE taking these medications        Instructions    Morning Afternoon Evening Bedtime    atorvastatin 20 MG Tabs   Commonly known as:  LIPITOR        Take 20 mg by mouth every evening.   Dose:  20 mg                        ciprofloxacin 500 MG Tabs   Commonly known as:  CIPRO        Take 500 mg by mouth 2 times a day.   Dose:  500 mg                        COPAXONE 40 MG/ML Sosy   Generic drug:  Glatiramer Acetate        INJECT 40 MG UNDER THE SKIN EVERY MONDAY, WEDNESDAY, AND FRIDAY                        Cranberry 450 MG Caps        Take  by mouth.                        methenamine hip 1 GM Tabs   Commonly known as:  HIPPREX        Take 1 g by mouth 2 Times a Day.   Dose:  1 g                        oxybutynin SR 5 MG Tb24   Commonly known as:  DITROPAN-XL        Take 5 mg by mouth every morning.   Dose:  5 mg                        predniSONE 10 MG Tabs   Commonly known as:  DELTASONE        Take 10 mg by mouth every day. Take with food   Dose:  10 mg                        tramadol 50 MG Tabs   Commonly known as:  ULTRAM        Take 50 mg by mouth every four hours as needed.   Dose:  50 mg                        vitamin D 1000 UNIT Tabs   Commonly known as:  cholecalciferol        Take 5,000 Units by mouth every bedtime.   Dose:  5000 Units                                Medication Information     Above and/or attached are the medications your physician expects you to take upon discharge. Review all of your home medications and newly ordered medications with your doctor and/or pharmacist. Follow medication instructions as directed by your doctor and/or pharmacist. Please keep your medication list with you and share with your physician. Update the information when medications are discontinued, doses are changed, or new medications (including over-the-counter products) are added; and carry medication  information at all times in the event of emergency situations.        Resources     Quit Smoking / Tobacco Use:    I understand the use of any tobacco products increases my chance of suffering from future heart disease or stroke and could cause other illnesses which may shorten my life. Quitting the use of tobacco products is the single most important thing I can do to improve my health. For further information on smoking / tobacco cessation call a Toll Free Quit Line at 1-739.701.5051 (*National Cancer [place]) or 1-161.154.4005 (American Lung Association) or you can access the web based program at www.lungusa.org.    [place] Tobacco Users Help Line:  (423) 459-5684       Toll Free: 1-369.880.5673  Quit Tobacco Program [facility] (571)889-7681    Crisis Hotline:    [place] Crisis Hotline:  6-356-TAMPXNM or 1-805.650.1465    [place] Crisis Hotline:    1-266.340.6006 or 983-022-9714    Discharge Survey:   Thank you for choosing [facility]. We hope we did everything we could to make your hospital stay a pleasant one. You may be receiving a survey and we would appreciate your time and participation in answering the questions. Your input is very valuable to us in our efforts to improve our service to our patients and their families.            Signatures     My signature on this form indicates that:    1. I acknowledge receipt and understanding of these Home Care Instruction.  2. My questions regarding this information have been answered to my satisfaction.  3. I have formulated a plan with my discharge nurse to obtain my prescribed medications for home.    __________________________________      __________________________________                   Patient Signature                                 Guardian/Responsible Adult Signature      __________________________________                 __________       ________                       Nurse Signature                                                Date                 Time

## 2017-05-23 NOTE — OR NURSING
1007 received from or  Oral airway dc'd by Dr Rodarte  resp spont left knee dressing c/d/i  Dp2+  Pt2  Foot warm  Brace intact  polarcare  Initiated  No c/o pain   Hemovac compressed with moderate amt sang drainage 1100 pain decreased per pt   Dressing remains c/d/i  Physical therapy called and message left x2  [phone]  Meets discharge criteria

## 2017-05-24 NOTE — DISCHARGE PLANNING
[facility] requested records on this patient as they got a referral from Dr. Horn's office to see this patient.

## 2017-08-17 ENCOUNTER — OFFICE VISIT (OUTPATIENT)
Dept: NEUROLOGY | Facility: MEDICAL CENTER | Age: 62
End: 2017-08-17
Payer: COMMERCIAL

## 2017-08-17 VITALS
DIASTOLIC BLOOD PRESSURE: 66 MMHG | TEMPERATURE: 98.2 F | SYSTOLIC BLOOD PRESSURE: 118 MMHG | HEIGHT: 67 IN | OXYGEN SATURATION: 95 % | WEIGHT: 208.4 LBS | HEART RATE: 80 BPM | BODY MASS INDEX: 32.71 KG/M2

## 2017-08-17 DIAGNOSIS — G35 MULTIPLE SCLEROSIS (HCC): ICD-10-CM

## 2017-08-17 PROCEDURE — 99213 OFFICE O/P EST LOW 20 MIN: CPT

## 2017-08-17 RX ORDER — HYDROCODONE BITARTRATE AND ACETAMINOPHEN 7.5; 325 MG/1; MG/1
TABLET ORAL
Refills: 0 | COMMUNITY
Start: 2017-05-22 | End: 2018-01-23

## 2017-08-17 RX ORDER — CEPHALEXIN 500 MG/1
CAPSULE ORAL
Refills: 0 | COMMUNITY
Start: 2017-05-22 | End: 2019-01-17

## 2017-08-17 RX ORDER — RIVAROXABAN 10 MG/1
TABLET, FILM COATED ORAL
Refills: 0 | COMMUNITY
Start: 2017-05-23 | End: 2019-01-17

## 2017-08-17 RX ORDER — CELECOXIB 200 MG/1
CAPSULE ORAL
Refills: 0 | COMMUNITY
Start: 2017-05-22 | End: 2019-01-17

## 2017-08-17 ASSESSMENT — PATIENT HEALTH QUESTIONNAIRE - PHQ9: CLINICAL INTERPRETATION OF PHQ2 SCORE: 0

## 2017-08-17 NOTE — PROGRESS NOTES
"Neurology Progress Note  8/17/2017      Referring MD:  Dr. Jacobo    Patient ID:  Name:             Bharti Zabala   YOB: 1955  Age:                 62 y.o.  female   MRN:               [id number]                                              Reason for Consult:      Follow-up: Multiple sclerosis    History of Present Illness:    This 62-year-old lady with patient from former practice and his multiple sclerosis managed on Copaxone 40 mg 3 times a week and she also has endocrine issues that are managed by an endocrinologist. She just had a partial knee replacement and is doing well with that. She's had no significant reactions from the Copaxone and has had no exacerbations. She continues to function normally. Medications include atorvastatin and prednisone and occasional pain medication relative to her recent knee surgery.    Review of Systems: Relates to her multiple sclerosis.  Constitutional: Denies fevers, Denies weight changes  Eyes: Denies changes in vision, no eye pain  Ears/Nose/Throat/Mouth: Denies nasal congestion or sore throat   Cardiovascular: Denies chest pain, Denies palpitations   Respiratory: Denies shortness of breath , Denies cough  Gastrointestinal/Hepatic: Denies abdominal pain, nausea, vomiting, diarrhea, constipation or GI bleeding   Genitourinary: Denies dysuria or frequency  Musculoskeletal/Rheum: Denies  joint pain and swelling, No edema  Skin: Denies rash  Neurological: Denies headache, confusion, memory loss or focal weakness/parasthesias  Psychiatric: denies mood disorder   Endocrine: Argelia thyroid problems  Heme/Oncology/Lymph Nodes: Denies enlarged lymph nodes, denies brusing or known bleeding disorder  All other systems were reviewed and are negative (AMA/CMS criteria)                Past Medical History:     Past Medical History   Diagnosis Date   • MS (multiple sclerosis) (CMS-HCC)    • Urinary bladder disorder      \"Hx of ongoing bladder infections, none at the " "moment \"   • Diabetes (CMS-HCC) 2017     Diet controlled   • Pain 2017     left knee   • High cholesterol    • Urinary incontinence 2017     Pt uses straight cath bid, \"can't totally empty bladder due to MS\"   • Bowel habit changes      constipation   • Anesthesia      \"During my  all my blood went to my feet, during spinal\"       Problems addressed this visit:    Problem List Items Addressed This Visit     None      Visit Diagnoses     Multiple sclerosis (CMS-HCC)               Past Surgical History:   Past Surgical History   Procedure Laterality Date   • Pr cholecystoenterostomy     • Primary c section     • Knee unicompartmental Left 2017     Procedure: KNEE UNICOMPARTMENTAL - MEDIAL [place];  Surgeon: Tim Horn M.D.;  Location: SURGERY [facility];  Service:          Current Outpatient Medications:  Current Outpatient Prescriptions   Medication   • hydrocodone-acetaminophen (NORCO) 7.5-325 MG per tablet   • COPAXONE 40 MG/ML Solution Prefilled Syringe   • tramadol (ULTRAM) 50 MG Tab   • Cranberry 450 MG Cap   • methenamine hip (HIPPREX) 1 GM Tab   • atorvastatin (LIPITOR) 20 MG TABS   • predniSONE (DELTASONE) 10 MG TABS   • vitamin D (CHOLECALCIFEROL) 1000 UNIT TABS   • celecoxib (CELEBREX) 200 MG Cap   • cephALEXin (KEFLEX) 500 MG Cap   • XARELTO 10 MG Tab tablet   • ciprofloxacin (CIPRO) 500 MG Tab   • oxybutynin SR (DITROPAN-XL) 5 MG TB24     No current facility-administered medications for this visit.       Medication Allergy:  Allergies   Allergen Reactions   • Sulfa Drugs Rash             Family History:  Family History   Problem Relation Age of Onset   • Cancer Other        Social History:  Social History     Social History   • Marital Status:      Spouse Name: N/A   • Number of Children: N/A   • Years of Education: N/A     Occupational History   • Not on file.     Social History Main Topics   • Smoking status: Never Smoker    • Smokeless tobacco: Never Used   • " "Alcohol Use: No   • Drug Use: No   • Sexual Activity: Not on file     Other Topics Concern   • Not on file     Social History Narrative       Physical Exam:  Vitals:   Filed Vitals:    08/17/17 1250   BP: 118/66   Pulse: 80   Temp: 36.8 °C (98.2 °F)   Height: 1.702 m (5' 7.01\")   Weight: 94.53 kg (208 lb 6.4 oz)   SpO2: 95%     General Appearance: Well-developed lady is obese. Gait and station are normal and the cane she carries she doesn't really need except for recovery from her knee surgery. Neurological exam is basically normal at this point.  Weight/BMI: Body mass index is 32.63 kg/(m^2).      Eyes:  Normal optic discs: Yes  Visual field: Normal  Pupillary responses: Normal  Extraocular movement: Normal    Cardiovascular:  Normal carotid pulses bilaterally: Yes  RRR with no MRGs: Yes  No peripheral edema, pulses intact: Yes    Musculoskeletal:  Normal gait and station: Yes  Normal muscle strength: Yes  Normal muscle tone, no atrophy: Yes  No abnormal movements: Yes    Plan:   She is doing well on the Copaxone with no significant side effects. Continue with that and see her in 6 months she's free to call if any other problems develop in the meantime.    Total length of time of this visit was 20 minutes of which greater than 50% was spent counseling the patient/family and coordinating care.    Thank you for allowing me to participate in his care.    Sincerely yours,        Silas Wang MD, PhD            "

## 2017-08-17 NOTE — MR AVS SNAPSHOT
"Bharti Zabala   2017 1:00 PM   Office Visit   MRN: [id number]    Department:  [facility]   Dept Phone:  [phone]    Description:  Female : 1955   Provider:  Silas Wang M.D.           Reason for Visit     Follow-Up MS      Allergies as of 2017     Allergen Noted Reactions    Sulfa Drugs 2014   Rash           You were diagnosed with     Multiple sclerosis (CMS-HCC)   [340.ICD-9-CM]         Vital Signs     Blood Pressure Pulse Temperature Height Weight Body Mass Index    118/66 mmHg 80 36.8 °C (98.2 °F) 1.702 m (5' 7.01\") 94.53 kg (208 lb 6.4 oz) 32.63 kg/m2    Oxygen Saturation Smoking Status                95% Never Smoker           Basic Information     Date Of Birth Sex Race Ethnicity Preferred Language    1955 Female White Non- English      Your appointments     2018  1:20 PM   Follow Up Visit with Silas Wang M.D.   [facility] (--)    [address] [place] [address], [address]  [facility] [id number] [phone]           You will be receiving a confirmation call a few days before your appointment from our automated call confirmation system.              Problem List              ICD-10-CM Priority Class Noted - Resolved    MS (multiple sclerosis) (CMS-HCC) G35   10/22/2012 - Present    Lower urinary tract infectious disease N39.0   10/22/2012 - Present    HYPOTENSION    10/23/2012 - Present    Flu syndrome J11.1   2014 - Present    Degenerative arthritis of left knee M17.12   2017 - Present      Health Maintenance        Date Due Completion Dates    IMM DTaP/Tdap/Td Vaccine (1 - Tdap) 1974 ---    PAP SMEAR 1976 ---    COLONOSCOPY 2005 ---    IMM ZOSTER VACCINE 2015 ---    MAMMOGRAM 2017, 2015, 2014, 2013, 2011, 10/3/2008, 10/3/2008, 2007, 2007    IMM INFLUENZA (1) 2017 ---            Current Immunizations     No immunizations on file.      Below and/or " attached are the medications your provider expects you to take. Review all of your home medications and newly ordered medications with your provider and/or pharmacist. Follow medication instructions as directed by your provider and/or pharmacist. Please keep your medication list with you and share with your provider. Update the information when medications are discontinued, doses are changed, or new medications (including over-the-counter products) are added; and carry medication information at all times in the event of emergency situations     Allergies:  SULFA DRUGS - Rash               Medications  Valid as of: August 17, 2017 -  1:13 PM    Generic Name Brand Name Tablet Size Instructions for use    Atorvastatin Calcium (Tab) LIPITOR 20 MG Take 20 mg by mouth every evening.        Celecoxib (Cap) CELEBREX 200 MG TK 1 C PO BID STOP FOR GI UPSET        Cephalexin (Cap) KEFLEX 500 MG TK 1 C PO TID        Cholecalciferol (Tab) cholecalciferol 1000 UNIT Take 5,000 Units by mouth every bedtime.        Ciprofloxacin HCl (Tab) CIPRO 500 MG Take 500 mg by mouth 2 times a day.        Cranberry (Cap) Cranberry 450 MG Take  by mouth.        Glatiramer Acetate (Solution Prefilled Syringe) COPAXONE 40 MG/ML INJECT 40 MG UNDER THE SKIN EVERY MONDAY, WEDNESDAY, AND FRIDAY        Hydrocodone-Acetaminophen (Tab) NORCO 7.5-325 MG TK 1 TO 2 TS PO Q 4 TO 6 H PRN P        Methenamine Hippurate (Tab) HIPPREX 1 GM Take 1 g by mouth 2 Times a Day.        Oxybutynin Chloride (TABLET SR 24 HR) DITROPAN-XL 5 MG Take 5 mg by mouth every morning.        PredniSONE (Tab) DELTASONE 10 MG Take 10 mg by mouth every day. Take with food         Rivaroxaban (Tab) XARELTO 10 MG TK 1 T PO QD        TraMADol HCl (Tab) ULTRAM 50 MG Take 50 mg by mouth every four hours as needed.        .                 Medicines prescribed today were sent to:     [id number] - KACIE FARMER - [address] [facility] [address]    [address]  [place] [address]    Phone: [phone] Fax: [phone]    Open 24 Hours?: No    [facility] - [place], [place] - [address] [facility]    [address] [facility] [address]    Phone: [phone] Fax: [phone]    Open 24 Hours?: No    [facility] - [place], [place] - [address] [place] [address]    Phone: [phone] Fax: [phone]    Open 24 Hours?: No      Medication refill instructions:       If your prescription bottle indicates you have medication refills left, it is not necessary to call your provider’s office. Please contact your pharmacy and they will refill your medication.    If your prescription bottle indicates you do not have any refills left, you may request refills at any time through one of the following ways: The online [employer] system (except Urgent Care), by calling your provider’s office, or by asking your pharmacy to contact your provider’s office with a refill request. Medication refills are processed only during regular business hours and may not be available until the next business day. Your provider may request additional information or to have a follow-up visit with you prior to refilling your medication.   *Please Note: Medication refills are assigned a new Rx number when refilled electronically. Your pharmacy may indicate that no refills were authorized even though a new prescription for the same medication is available at the pharmacy. Please request the medicine by name with the pharmacy before contacting your provider for a refill.           [employer] Access Code: Activation code not generated  Current [employer] Status: Active

## 2017-10-07 ENCOUNTER — HOSPITAL ENCOUNTER (EMERGENCY)
Facility: MEDICAL CENTER | Age: 62
End: 2017-10-07
Payer: COMMERCIAL

## 2017-10-07 VITALS
SYSTOLIC BLOOD PRESSURE: 112 MMHG | WEIGHT: 207.89 LBS | DIASTOLIC BLOOD PRESSURE: 65 MMHG | TEMPERATURE: 97.3 F | BODY MASS INDEX: 31.51 KG/M2 | HEART RATE: 123 BPM | RESPIRATION RATE: 14 BRPM | OXYGEN SATURATION: 93 % | HEIGHT: 68 IN

## 2017-10-07 LAB — BLOOD CULTURE HOLD CXBCH: NORMAL

## 2017-10-07 PROCEDURE — 302449 STATCHG TRIAGE ONLY (STATISTIC)

## 2017-10-07 NOTE — ED NOTES
Bharti Zabala  62 y.o.  ,  Chief Complaint   Patient presents with   • Fever     Pt states that she has been feeling hot since waking this morning   • Extremity Weakness     bilat lower extemities. States she thinks it is an MS flare up     Pt ambulatory with a walker, states that normally she only needs a cane. Pt assisted to [facility] danelle with family, instructed on red phone use for assistance.

## 2017-10-07 NOTE — ED NOTES
Pt ambulatory to triage, states that she is feeling better and does not want to wait any longer to see a doctor. Apologized for wait time. Pt signed AMA form, ambulatory out

## 2017-11-16 ENCOUNTER — HOSPITAL ENCOUNTER (OUTPATIENT)
Facility: MEDICAL CENTER | Age: 62
End: 2017-11-16
Attending: PHYSICIAN ASSISTANT
Payer: COMMERCIAL

## 2017-11-16 PROCEDURE — 87086 URINE CULTURE/COLONY COUNT: CPT

## 2017-11-16 PROCEDURE — 87077 CULTURE AEROBIC IDENTIFY: CPT

## 2017-11-16 PROCEDURE — 87186 SC STD MICRODIL/AGAR DIL: CPT

## 2017-11-19 LAB
BACTERIA UR CULT: ABNORMAL
SIGNIFICANT IND 70042: ABNORMAL
SOURCE SOURCE: ABNORMAL

## 2017-12-04 ENCOUNTER — HOSPITAL ENCOUNTER (OUTPATIENT)
Dept: LAB | Facility: MEDICAL CENTER | Age: 62
End: 2017-12-04
Attending: PHYSICIAN ASSISTANT
Payer: COMMERCIAL

## 2017-12-04 PROCEDURE — 87086 URINE CULTURE/COLONY COUNT: CPT

## 2017-12-06 LAB
BACTERIA UR CULT: NORMAL
SIGNIFICANT IND 70042: NORMAL
SITE SITE: NORMAL
SOURCE SOURCE: NORMAL

## 2018-01-16 ENCOUNTER — OFFICE VISIT (OUTPATIENT)
Dept: NEUROLOGY | Facility: MEDICAL CENTER | Age: 63
End: 2018-01-16
Payer: COMMERCIAL

## 2018-01-16 VITALS
SYSTOLIC BLOOD PRESSURE: 126 MMHG | HEIGHT: 67 IN | BODY MASS INDEX: 33.9 KG/M2 | DIASTOLIC BLOOD PRESSURE: 64 MMHG | OXYGEN SATURATION: 92 % | WEIGHT: 216 LBS | TEMPERATURE: 97.9 F | HEART RATE: 91 BPM

## 2018-01-16 DIAGNOSIS — G35 MS (MULTIPLE SCLEROSIS) (HCC): ICD-10-CM

## 2018-01-16 PROCEDURE — 99214 OFFICE O/P EST MOD 30 MIN: CPT | Performed by: PSYCHIATRY & NEUROLOGY

## 2018-01-16 NOTE — ASSESSMENT & PLAN NOTE
Pt has been on Copaxone for over 10 years. DX'd by Tom before treatment available. Pt has been on Avonex but it made her sick. Pt states that she takes vitamin D. Pt sometimes forgets to do her shots. Pt has some balance issues and she has some cognitive issues. Pt states she has issues with copaxone.

## 2018-01-24 NOTE — PROGRESS NOTES
"CHIEF COMPLAINT  Chief Complaint   Patient presents with   • New Patient     MS       HPI  Bharti Zabala is a 62 y.o. female who presents to [facility] for her multiple sclerosis treatment.  MS (multiple sclerosis)  Pt has been on Copaxone for over 10 years. DX'd by Tom before treatment available. Pt has been on Avonex but it made her sick. Pt states that she takes vitamin D. Pt sometimes forgets to do her shots. Pt has some balance issues and she has some cognitive issues. Pt states she has issues with copaxone.    REVIEW OF SYSTEMS  Pertinent Positives: Fatigue, occasional headaches, cognitive decline   All other systems are negative.     PAST MEDICAL HISTORY  Past Medical History:   Diagnosis Date   • Anesthesia     \"During my  all my blood went to my feet, during spinal\"   • Bowel habit changes     constipation   • Diabetes (CMS-[facility]) 2017    Diet controlled   • High cholesterol    • MS (multiple sclerosis) (CMS-HCC)    • Pain 2017    left knee   • Urinary bladder disorder     \"Hx of ongoing bladder infections, none at the moment \"   • Urinary incontinence 2017    Pt uses straight cath bid, \"can't totally empty bladder due to MS\"       SOCIAL HISTORY  Social History     Social History   • Marital status:      Spouse name: N/A   • Number of children: N/A   • Years of education: N/A     Occupational History   • Not on file.     Social History Main Topics   • Smoking status: Never Smoker   • Smokeless tobacco: Never Used   • Alcohol use No   • Drug use: No   • Sexual activity: Not on file     Other Topics Concern   • Not on file     Social History Narrative   • No narrative on file       SURGICAL HISTORY  Past Surgical History:   Procedure Laterality Date   • KNEE UNICOMPARTMENTAL Left 2017    Procedure: KNEE UNICOMPARTMENTAL - MEDIAL [place];  Surgeon: Tim Horn M.D.;  Location: SURGERY [facility];  Service:    • PB CHOLECYSTOENTEROSTOMY     • PRIMARY C " "SECTION  1991       CURRENT MEDICATIONS  Current Outpatient Prescriptions   Medication Sig Dispense Refill   • COPAXONE 40 MG/ML Solution Prefilled Syringe INJECT 40 MG UNDER THE SKIN EVERY MONDAY, WEDNESDAY, AND FRIDAY 12 mL 12   • Cranberry 450 MG Cap Take  by mouth.     • methenamine hip (HIPPREX) 1 GM Tab Take 1 g by mouth 2 Times a Day.     • oxybutynin SR (DITROPAN-XL) 5 MG TB24 Take 5 mg by mouth every morning.     • atorvastatin (LIPITOR) 20 MG TABS Take 20 mg by mouth every evening.     • predniSONE (DELTASONE) 10 MG TABS Take 10 mg by mouth every day. Take with food      • vitamin D (CHOLECALCIFEROL) 1000 UNIT TABS Take 5,000 Units by mouth every bedtime.     • celecoxib (CELEBREX) 200 MG Cap TK 1 C PO BID STOP FOR GI UPSET  0   • cephALEXin (KEFLEX) 500 MG Cap TK 1 C PO TID  0   • hydrocodone-acetaminophen (NORCO) 7.5-325 MG per tablet TK 1 TO 2 TS PO Q 4 TO 6 H PRN P  0   • XARELTO 10 MG Tab tablet TK 1 T PO QD  0   • ciprofloxacin (CIPRO) 500 MG Tab Take 500 mg by mouth 2 times a day.       No current facility-administered medications for this visit.        ALLERGIES  Allergies   Allergen Reactions   • Sulfa Drugs Rash             PHYSICAL EXAM  VITAL SIGNS: /64   Pulse 91   Temp 36.6 °C (97.9 °F)   Ht 1.702 m (5' 7\")   Wt 98 kg (216 lb)   SpO2 92%   BMI 33.83 kg/m²   Constitutional: Well developed, Well nourished, No acute distress, Non-toxic appearance.   HENT: Normocephalic atraumatic  Eyes: Fundi disc pale  Neck: Normal range of motion, No tenderness, Supple, No stridor.   Cardiovascular: Normal heart rate, Normal rhythm, No murmurs, No rubs, No gallops.   Thorax & Lungs: Normal breath sounds, No respiratory distress, No wheezing, No chest tenderness.   Abdomen: Bowel sounds normal, Soft, No tenderness, No masses, No pulsatile masses.   Skin: Warm, Dry, No erythema, No rash.   Back: No tenderness, No CVA tenderness.   Extremities: Intact distal pulses, No edema, No tenderness, No " cyanosis, No clubbing.   Neurologic: Gait and station are normal say for a little bit of limping due   to her collateral ligament injury.  Romberg is negative.  Strength in the   upper and lower extremities is normal.  Plantar responses are equivocal.  She   can do finger-to-nose, heel-to-shin well.  Fine finger movements are done   well.  She has a little sustained nystagmus on lateral gaze.  Pupils equal,   round and reactive to light.  Fundi appeared to be in normal.  Range of motion   of the neck is normal.  No facial weakness or facial sensory deficit.  Pupils   are reactive to light and equal.  Psychiatric: Affect normal, Judgment normal, Mood normal.   Lab: Reviewed with patient in detail and as noted in results.    EEG  No abnormality    RADIOLOGY/PROCEDURES  MRI reviewed with the patient in the PACS system       Narrative      Pre-and postcontrast examination of the brain  3/18/2014 8:25 AM    HISTORY/REASON FOR EXAM:  Multiple sclerosis.      TECHNIQUE/EXAM DESCRIPTION:   MRI of the brain without and with contrast.    T1 sagittal, T2 fast spin-echo axial, T1 coronal, FLAIR coronal, Diffusion weighted and Apparent Diffusion Coefficient (ADC map) axial images were obtained of the whole brain. T1 post-contrast axial and T1 post-contrast coronal images were obtained..    The study was performed on a [employer] Signa 1.5 Charis MRI scanner.    20 mL gadolinium contrast was administered intravenously.    COMPARISON:  6/11/00    FINDINGS:     There are multiple T2 hyperintense lesions in the subcortical and periventricular white matter, brainstem and middle cerebellar peduncles. None of the lesions demonstrates contrast enhancement. Most of the lesions are hypointense on T1-weighted sequences. There are no new lesions.    There is no acute infarct. There is no acute or chronic parenchymal hemorrhage. The ventricles, cortical sulci and the basal cisterns appear prominent consistent with mild cerebral and cerebellar  atrophy.    The visualized flow voids of the cerebral vasculature appear normal.  There is no large lesion identified in the expected course of the intracranial portions of the cranial nerves.    The skull bones appear normal. The paranasal sinuses are clear. The extracranial soft tissue including orbits appear grossly normal.   Impression       1.  Multifocal T2 hyperintense lesions in the subcortical and periventricular white matter, brainstem and the bilateral middle cerebellar peduncles consistent with chronic plaques of multiple sclerosis. None of the lesions demonstrates contrast enhancement. Most of the lesions are hypointense on T1-weighted sequences. There are no new lesions.  2.  Mild cerebral atrophy.             ASSESSMENT AND PLAN  1. MS (multiple sclerosis) (CMS-[facility])  Plan to continue Copaxone at the current dose. Patient is going to have a repeat MRI scan of the brain to determine if there has been any progression of disease. We discussed cognitive concerns in addition to date stability. We discussed healthy diet and gentle regular exercise program. Vitamin supplementation with vitamin D and disease prevention was covered with the patient.       I spent 45 minutes with this patient, over fifty percent was spent counseling patient on their condition, best management practices, reviewing test results and risks and benefits of treatment.

## 2018-03-02 ENCOUNTER — HOSPITAL ENCOUNTER (OUTPATIENT)
Dept: RADIOLOGY | Facility: MEDICAL CENTER | Age: 63
End: 2018-03-02
Attending: FAMILY MEDICINE
Payer: COMMERCIAL

## 2018-03-02 DIAGNOSIS — Z00.01 ENCOUNTER FOR GENERAL ADULT MEDICAL EXAMINATION WITH ABNORMAL FINDINGS: ICD-10-CM

## 2018-03-02 PROCEDURE — 77080 DXA BONE DENSITY AXIAL: CPT

## 2018-03-05 ENCOUNTER — TELEPHONE (OUTPATIENT)
Dept: NEUROLOGY | Facility: MEDICAL CENTER | Age: 63
End: 2018-03-05

## 2018-03-05 NOTE — TELEPHONE ENCOUNTER
Sent via [employer]    Bharti KHAN Neurology [facility]   Phone Number: [phone]             I saw you 01-16- 2018 as a new patient transfer from Dr. Wang. We discussed having a bone density test since I take Prednisone and am 63 years old. I have had many bone density tests done.   Dr. Jacobo, my GP,  scheduled to have the test 03- at [facility].     Dr. Jacobo called me later that day and said the results show I have Osteoporosis. He suggested we investigate my insurance coverage and think about Prolia.       I will get historical bone tests from Dr. Huffman.     My questions for you are   1. Would taking Copaxone and Prolia have any problems or interferences or side effects?   2. Do you have another idea for treatment?   3. I will be in [place] the month of April. Should I wait to start treatment on return?     Thank you

## 2018-03-09 ENCOUNTER — HOSPITAL ENCOUNTER (OUTPATIENT)
Facility: MEDICAL CENTER | Age: 63
End: 2018-03-09
Attending: INTERNAL MEDICINE
Payer: COMMERCIAL

## 2018-03-09 LAB
CREAT UR-MCNC: 94.1 MG/DL
POTASSIUM UR-SCNC: 17.4 MMOL/L
SODIUM UR-SCNC: 38 MMOL/L

## 2018-03-09 PROCEDURE — 84133 ASSAY OF URINE POTASSIUM: CPT | Mod: 91

## 2018-03-09 PROCEDURE — 84133 ASSAY OF URINE POTASSIUM: CPT

## 2018-03-09 PROCEDURE — 84300 ASSAY OF URINE SODIUM: CPT

## 2018-03-09 PROCEDURE — 82340 ASSAY OF CALCIUM IN URINE: CPT

## 2018-03-09 PROCEDURE — 84300 ASSAY OF URINE SODIUM: CPT | Mod: 91

## 2018-03-09 PROCEDURE — 82340 ASSAY OF CALCIUM IN URINE: CPT | Mod: 91

## 2018-03-09 PROCEDURE — 82570 ASSAY OF URINE CREATININE: CPT | Mod: 91

## 2018-03-09 PROCEDURE — 82570 ASSAY OF URINE CREATININE: CPT

## 2018-03-10 LAB
CREAT UR-MCNC: 37.1 MG/DL
POTASSIUM UR-SCNC: 7.2 MMOL/L
SODIUM UR-SCNC: 27 MMOL/L

## 2018-03-11 LAB
CALCIUM 24H UR-MCNC: 9.2 MG/DL
CALCIUM 24H UR-MRATE: NORMAL MG/D
CALCIUM/CREAT 24H UR: 101 MG/G (ref 20–300)
COLLECT DURATION TIME SPEC: NORMAL HRS
CREAT 24H UR-MCNC: 91 MG/DL
CREAT 24H UR-MRATE: NORMAL MG/D (ref 500–1400)
SPECIMEN VOL ?TM UR: NORMAL ML

## 2018-03-12 LAB
CALCIUM 24H UR-MCNC: 4.2 MG/DL
CALCIUM 24H UR-MRATE: NORMAL MG/D
CALCIUM/CREAT 24H UR: 131 MG/G (ref 20–300)
COLLECT DURATION TIME SPEC: NORMAL HRS
CREAT 24H UR-MCNC: 32 MG/DL
CREAT 24H UR-MRATE: NORMAL MG/D (ref 500–1400)
SPECIMEN VOL ?TM UR: NORMAL ML

## 2018-03-13 NOTE — TELEPHONE ENCOUNTER
Mainor Hay ,    There is no problem to taking Copaxone and probably together. I would ask Dr. HARLEY about timing of the Prolia in relation to her trip to [place].     Thanks,  SONYA GOLD.

## 2018-03-28 ENCOUNTER — TELEPHONE (OUTPATIENT)
Dept: NEUROLOGY | Facility: MEDICAL CENTER | Age: 63
End: 2018-03-28

## 2018-03-28 NOTE — TELEPHONE ENCOUNTER
[facility] called today requesting the npi of Dr Lambert for the medication Copaxone, I stated this patient is no longer under his care that it is now Dr Bloch. [facility] Bloch can wrie out a new script for the medication and also stated it can be faxed or verbally called in once done. Phone # [phone], fax # [phone].

## 2018-05-23 ENCOUNTER — HOSPITAL ENCOUNTER (OUTPATIENT)
Dept: RADIOLOGY | Facility: MEDICAL CENTER | Age: 63
End: 2018-05-23
Attending: CLINICAL NURSE SPECIALIST
Payer: COMMERCIAL

## 2018-05-23 DIAGNOSIS — Z12.31 VISIT FOR SCREENING MAMMOGRAM: ICD-10-CM

## 2018-05-23 PROCEDURE — 77067 SCR MAMMO BI INCL CAD: CPT

## 2018-07-10 ENCOUNTER — OFFICE VISIT (OUTPATIENT)
Dept: NEUROLOGY | Facility: MEDICAL CENTER | Age: 63
End: 2018-07-10
Payer: COMMERCIAL

## 2018-07-10 VITALS
HEIGHT: 67 IN | HEART RATE: 91 BPM | BODY MASS INDEX: 29.41 KG/M2 | OXYGEN SATURATION: 95 % | WEIGHT: 187.39 LBS | SYSTOLIC BLOOD PRESSURE: 100 MMHG | DIASTOLIC BLOOD PRESSURE: 68 MMHG | TEMPERATURE: 98.2 F

## 2018-07-10 DIAGNOSIS — G35 MS (MULTIPLE SCLEROSIS) (HCC): ICD-10-CM

## 2018-07-10 PROCEDURE — 99214 OFFICE O/P EST MOD 30 MIN: CPT | Performed by: PSYCHIATRY & NEUROLOGY

## 2018-07-10 NOTE — ASSESSMENT & PLAN NOTE
Pt had her copaxone frozen and took the generic medication. Pt does walking and she also does rowing. Pt needs to work on balance.

## 2018-07-17 NOTE — PROGRESS NOTES
"CHIEF COMPLAINT  Chief Complaint   Patient presents with   • Follow-Up     MS       HPI  Bharti Zabala is a 62 y.o. female who presents to [facility] for her multiple sclerosis treatment.  MS (multiple sclerosis)  Pt had her copaxone frozen and took the generic medication. Pt does walking and she also does rowing. Pt needs to work on balance.    REVIEW OF SYSTEMS  Pertinent Positives: Fatigue, occasional headaches, cognitive decline   All other systems are negative.     PAST MEDICAL HISTORY  Past Medical History:   Diagnosis Date   • Anesthesia     \"During my  all my blood went to my feet, during spinal\"   • Bowel habit changes     constipation   • Diabetes (HCC) 2017    Diet controlled   • High cholesterol    • MS (multiple sclerosis) ([facility])    • Pain 2017    left knee   • Urinary bladder disorder     \"Hx of ongoing bladder infections, none at the moment \"   • Urinary incontinence 2017    Pt uses straight cath bid, \"can't totally empty bladder due to MS\"       SOCIAL HISTORY  Social History     Social History   • Marital status:      Spouse name: N/A   • Number of children: N/A   • Years of education: N/A     Occupational History   • Not on file.     Social History Main Topics   • Smoking status: Never Smoker   • Smokeless tobacco: Never Used   • Alcohol use No   • Drug use: No   • Sexual activity: Not on file     Other Topics Concern   • Not on file     Social History Narrative   • No narrative on file       SURGICAL HISTORY  Past Surgical History:   Procedure Laterality Date   • KNEE UNICOMPARTMENTAL Left 2017    Procedure: KNEE UNICOMPARTMENTAL - MEDIAL [place];  Surgeon: Tim Horn M.D.;  Location: SURGERY [facility];  Service:    • PB CHOLECYSTOENTEROSTOMY     • PRIMARY C SECTION         CURRENT MEDICATIONS  Current Outpatient Prescriptions   Medication Sig Dispense Refill   • COPAXONE 40 MG/ML Solution Prefilled Syringe INJECT 40 MG UNDER THE SKIN EVERY " "MONDAY, WEDNESDAY, AND FRIDAY 12 mL 12   • Cranberry 450 MG Cap Take  by mouth.     • methenamine hip (HIPPREX) 1 GM Tab Take 1 g by mouth 2 Times a Day.     • oxybutynin SR (DITROPAN-XL) 5 MG TB24 Take 5 mg by mouth every morning.     • atorvastatin (LIPITOR) 20 MG TABS Take 20 mg by mouth every evening.     • predniSONE (DELTASONE) 10 MG TABS Take 10 mg by mouth every day. Take with food      • vitamin D (CHOLECALCIFEROL) 1000 UNIT TABS Take 5,000 Units by mouth every bedtime.     • celecoxib (CELEBREX) 200 MG Cap TK 1 C PO BID STOP FOR GI UPSET  0   • cephALEXin (KEFLEX) 500 MG Cap TK 1 C PO TID  0   • XARELTO 10 MG Tab tablet TK 1 T PO QD  0   • ciprofloxacin (CIPRO) 500 MG Tab Take 500 mg by mouth 2 times a day.       No current facility-administered medications for this visit.        ALLERGIES  Allergies   Allergen Reactions   • Sulfa Drugs Rash             PHYSICAL EXAM  VITAL SIGNS: /68   Pulse 91   Temp 36.8 °C (98.2 °F)   Ht 1.702 m (5' 7\")   Wt 85 kg (187 lb 6.3 oz)   SpO2 95%   BMI 29.35 kg/m²   Constitutional: Well developed, Well nourished, No acute distress, Non-toxic appearance.   HENT: Normocephalic atraumatic  Eyes: Fundi disc pale  Neck: Normal range of motion, No tenderness, Supple, No stridor.   Cardiovascular: Normal heart rate, Normal rhythm, No murmurs, No rubs, No gallops.   Thorax & Lungs: Normal breath sounds, No respiratory distress, No wheezing, No chest tenderness.   Abdomen: Bowel sounds normal, Soft, No tenderness, No masses, No pulsatile masses.   Skin: Warm, Dry, No erythema, No rash.   Back: No tenderness, No CVA tenderness.   Extremities: Intact distal pulses, No edema, No tenderness, No cyanosis, No clubbing.   Neurologic: Gait and station are normal say for a little bit of limping due   to her collateral ligament injury.  Romberg is negative.  Strength in the   upper and lower extremities is normal.  Plantar responses are equivocal.  She   can do finger-to-nose, " heel-to-shin well.  Fine finger movements are done   well.  She has a little sustained nystagmus on lateral gaze.  Pupils equal,   round and reactive to light.  Fundi appeared to be in normal.  Range of motion   of the neck is normal.  No facial weakness or facial sensory deficit.  Pupils   are reactive to light and equal.  Psychiatric: Affect normal, Judgment normal, Mood normal.   Lab: Reviewed with patient in detail and as noted in results.    EEG  No abnormality    RADIOLOGY/PROCEDURES  MRI reviewed with the patient in the PACS system       Narrative      Pre-and postcontrast examination of the brain  3/18/2014 8:25 AM    HISTORY/REASON FOR EXAM:  Multiple sclerosis.      TECHNIQUE/EXAM DESCRIPTION:   MRI of the brain without and with contrast.    T1 sagittal, T2 fast spin-echo axial, T1 coronal, FLAIR coronal, Diffusion weighted and Apparent Diffusion Coefficient (ADC map) axial images were obtained of the whole brain. T1 post-contrast axial and T1 post-contrast coronal images were obtained..    The study was performed on a [employer] 1.5 Charis MRI scanner.    20 mL gadolinium contrast was administered intravenously.    COMPARISON:  6/11/00    FINDINGS:     There are multiple T2 hyperintense lesions in the subcortical and periventricular white matter, brainstem and middle cerebellar peduncles. None of the lesions demonstrates contrast enhancement. Most of the lesions are hypointense on T1-weighted sequences. There are no new lesions.    There is no acute infarct. There is no acute or chronic parenchymal hemorrhage. The ventricles, cortical sulci and the basal cisterns appear prominent consistent with mild cerebral and cerebellar atrophy.    The visualized flow voids of the cerebral vasculature appear normal.  There is no large lesion identified in the expected course of the intracranial portions of the cranial nerves.    The skull bones appear normal. The paranasal sinuses are clear. The extracranial soft  tissue including orbits appear grossly normal.   Impression       1.  Multifocal T2 hyperintense lesions in the subcortical and periventricular white matter, brainstem and the bilateral middle cerebellar peduncles consistent with chronic plaques of multiple sclerosis. None of the lesions demonstrates contrast enhancement. Most of the lesions are hypointense on T1-weighted sequences. There are no new lesions.  2.  Mild cerebral atrophy.             ASSESSMENT AND PLAN  1. MS (multiple sclerosis) (CMS-HCC)  Plan to continue Copaxone at the current dose. Patient is going to have a repeat MRI scan of the brain to determine if there has been any progression of disease. Pt needs sedation and is medically cleared for sedation during her MRI. We discussed cognitive concerns in addition to gait stability. We discussed healthy diet and gentle regular exercise program. Vitamin supplementation with vitamin D and disease prevention was covered with the patient.       I spent 35 minutes with this patient face to face, over fifty percent was spent counseling patient on their condition, best management practices, reviewing test results and risks and benefits of treatment.

## 2018-07-18 LAB
ALBUMIN SERPL-MCNC: 4.2 G/DL (ref 3.6–4.8)
ALBUMIN/GLOB SERPL: 1.9 {RATIO} (ref 1.2–2.2)
ALP SERPL-CCNC: 66 IU/L (ref 39–117)
ALT SERPL-CCNC: 29 IU/L (ref 0–32)
AST SERPL-CCNC: 14 IU/L (ref 0–40)
BASOPHILS # BLD AUTO: 0 X10E3/UL (ref 0–0.2)
BASOPHILS NFR BLD AUTO: 0 %
BILIRUB SERPL-MCNC: 0.6 MG/DL (ref 0–1.2)
BUN SERPL-MCNC: 15 MG/DL (ref 8–27)
BUN/CREAT SERPL: 19 (ref 12–28)
CALCIUM SERPL-MCNC: 9.5 MG/DL (ref 8.7–10.3)
CHLORIDE SERPL-SCNC: 103 MMOL/L (ref 96–106)
CO2 SERPL-SCNC: 25 MMOL/L (ref 20–29)
CREAT SERPL-MCNC: 0.78 MG/DL (ref 0.57–1)
EOSINOPHIL # BLD AUTO: 0.1 X10E3/UL (ref 0–0.4)
EOSINOPHIL NFR BLD AUTO: 1 %
ERYTHROCYTE [DISTWIDTH] IN BLOOD BY AUTOMATED COUNT: 14.7 % (ref 12.3–15.4)
GLOBULIN SER CALC-MCNC: 2.2 G/DL (ref 1.5–4.5)
GLUCOSE SERPL-MCNC: 91 MG/DL (ref 65–99)
HCT VFR BLD AUTO: 42.3 % (ref 34–46.6)
HGB BLD-MCNC: 13.4 G/DL (ref 11.1–15.9)
IF AFRICAN AMERICAN  100797: 94 ML/MIN/1.73
IF NON AFRICAN AMER 100791: 81 ML/MIN/1.73
IMM GRANULOCYTES # BLD: 0.1 X10E3/UL (ref 0–0.1)
IMM GRANULOCYTES NFR BLD: 1 %
IMMATURE CELLS  115398: ABNORMAL
LYMPHOCYTES # BLD AUTO: 2.8 X10E3/UL (ref 0.7–3.1)
LYMPHOCYTES NFR BLD AUTO: 23 %
MCH RBC QN AUTO: 29.2 PG (ref 26.6–33)
MCHC RBC AUTO-ENTMCNC: 31.7 G/DL (ref 31.5–35.7)
MCV RBC AUTO: 92 FL (ref 79–97)
MONOCYTES # BLD AUTO: 0.7 X10E3/UL (ref 0.1–0.9)
MONOCYTES NFR BLD AUTO: 6 %
MORPHOLOGY BLD-IMP: ABNORMAL
NEUTROPHILS # BLD AUTO: 8.2 X10E3/UL (ref 1.4–7)
NEUTROPHILS NFR BLD AUTO: 69 %
NRBC BLD AUTO-RTO: ABNORMAL %
PLATELET # BLD AUTO: 233 X10E3/UL (ref 150–379)
POTASSIUM SERPL-SCNC: 4.8 MMOL/L (ref 3.5–5.2)
PROT SERPL-MCNC: 6.4 G/DL (ref 6–8.5)
RBC # BLD AUTO: 4.59 X10E6/UL (ref 3.77–5.28)
SODIUM SERPL-SCNC: 143 MMOL/L (ref 134–144)
WBC # BLD AUTO: 11.9 X10E3/UL (ref 3.4–10.8)

## 2018-07-26 ENCOUNTER — HOSPITAL ENCOUNTER (OUTPATIENT)
Dept: RADIOLOGY | Facility: MEDICAL CENTER | Age: 63
End: 2018-07-26
Attending: PSYCHIATRY & NEUROLOGY
Payer: COMMERCIAL

## 2018-07-26 DIAGNOSIS — G35 MS (MULTIPLE SCLEROSIS) (HCC): ICD-10-CM

## 2018-07-26 PROCEDURE — 70553 MRI BRAIN STEM W/O & W/DYE: CPT

## 2018-07-26 PROCEDURE — 72156 MRI NECK SPINE W/O & W/DYE: CPT

## 2018-07-26 PROCEDURE — 700117 HCHG RX CONTRAST REV CODE 255: Performed by: PSYCHIATRY & NEUROLOGY

## 2018-07-26 PROCEDURE — A9579 GAD-BASE MR CONTRAST NOS,1ML: HCPCS | Performed by: PSYCHIATRY & NEUROLOGY

## 2018-07-26 RX ADMIN — GADODIAMIDE 20 ML: 287 INJECTION INTRAVENOUS at 09:24

## 2018-08-21 ENCOUNTER — TELEPHONE (OUTPATIENT)
Dept: NEUROLOGY | Facility: MEDICAL CENTER | Age: 63
End: 2018-08-21

## 2018-08-21 NOTE — TELEPHONE ENCOUNTER
Charles at [facility] called [facility] pt has an appt with anesthesia for tomorrow am. Wants to know if dr Bloch can update her notes to have within 30 days in order to do the anesthesia for the MRI. Thank you

## 2018-08-21 NOTE — TELEPHONE ENCOUNTER
Pt called states she forgot where she was advised to go for PT for her  balance. Please advise. Thank you      Called Imaging  Left them message that dr Bloch not done with note update.

## 2018-09-26 ENCOUNTER — HOSPITAL ENCOUNTER (EMERGENCY)
Facility: MEDICAL CENTER | Age: 63
End: 2018-09-27
Attending: EMERGENCY MEDICINE
Payer: COMMERCIAL

## 2018-09-26 DIAGNOSIS — N39.0 ACUTE UTI: ICD-10-CM

## 2018-09-26 PROCEDURE — 99284 EMERGENCY DEPT VISIT MOD MDM: CPT

## 2018-09-27 ENCOUNTER — APPOINTMENT (OUTPATIENT)
Dept: RADIOLOGY | Facility: MEDICAL CENTER | Age: 63
End: 2018-09-27
Attending: EMERGENCY MEDICINE
Payer: COMMERCIAL

## 2018-09-27 VITALS
DIASTOLIC BLOOD PRESSURE: 71 MMHG | SYSTOLIC BLOOD PRESSURE: 122 MMHG | RESPIRATION RATE: 18 BRPM | HEART RATE: 102 BPM | TEMPERATURE: 99.6 F | BODY MASS INDEX: 28.19 KG/M2 | OXYGEN SATURATION: 98 % | WEIGHT: 180 LBS

## 2018-09-27 LAB
ALBUMIN SERPL BCP-MCNC: 4.3 G/DL (ref 3.2–4.9)
ALBUMIN/GLOB SERPL: 1.3 G/DL
ALP SERPL-CCNC: 65 U/L (ref 30–99)
ALT SERPL-CCNC: 17 U/L (ref 2–50)
ANION GAP SERPL CALC-SCNC: 9 MMOL/L (ref 0–11.9)
APPEARANCE UR: ABNORMAL
AST SERPL-CCNC: 14 U/L (ref 12–45)
BACTERIA #/AREA URNS HPF: ABNORMAL /HPF
BASOPHILS # BLD AUTO: 0.5 % (ref 0–1.8)
BASOPHILS # BLD: 0.06 K/UL (ref 0–0.12)
BILIRUB SERPL-MCNC: 0.6 MG/DL (ref 0.1–1.5)
BILIRUB UR QL STRIP.AUTO: NEGATIVE
BUN SERPL-MCNC: 18 MG/DL (ref 8–22)
CALCIUM SERPL-MCNC: 9.2 MG/DL (ref 8.5–10.5)
CHLORIDE SERPL-SCNC: 104 MMOL/L (ref 96–112)
CO2 SERPL-SCNC: 27 MMOL/L (ref 20–33)
COLOR UR: YELLOW
CREAT SERPL-MCNC: 0.73 MG/DL (ref 0.5–1.4)
EOSINOPHIL # BLD AUTO: 0.15 K/UL (ref 0–0.51)
EOSINOPHIL NFR BLD: 1.2 % (ref 0–6.9)
ERYTHROCYTE [DISTWIDTH] IN BLOOD BY AUTOMATED COUNT: 43.9 FL (ref 35.9–50)
FLUAV RNA SPEC QL NAA+PROBE: NEGATIVE
FLUBV RNA SPEC QL NAA+PROBE: NEGATIVE
GLOBULIN SER CALC-MCNC: 3.2 G/DL (ref 1.9–3.5)
GLUCOSE SERPL-MCNC: 89 MG/DL (ref 65–99)
GLUCOSE UR STRIP.AUTO-MCNC: NEGATIVE MG/DL
HCT VFR BLD AUTO: 41.5 % (ref 37–47)
HGB BLD-MCNC: 13.4 G/DL (ref 12–16)
IMM GRANULOCYTES # BLD AUTO: 0.08 K/UL (ref 0–0.11)
IMM GRANULOCYTES NFR BLD AUTO: 0.7 % (ref 0–0.9)
KETONES UR STRIP.AUTO-MCNC: NEGATIVE MG/DL
LACTATE BLD-SCNC: 1.7 MMOL/L (ref 0.5–2)
LEUKOCYTE ESTERASE UR QL STRIP.AUTO: NEGATIVE
LYMPHOCYTES # BLD AUTO: 1.16 K/UL (ref 1–4.8)
LYMPHOCYTES NFR BLD: 9.6 % (ref 22–41)
MCH RBC QN AUTO: 29.6 PG (ref 27–33)
MCHC RBC AUTO-ENTMCNC: 32.3 G/DL (ref 33.6–35)
MCV RBC AUTO: 91.6 FL (ref 81.4–97.8)
MICRO URNS: ABNORMAL
MONOCYTES # BLD AUTO: 0.69 K/UL (ref 0–0.85)
MONOCYTES NFR BLD AUTO: 5.7 % (ref 0–13.4)
NEUTROPHILS # BLD AUTO: 9.95 K/UL (ref 2–7.15)
NEUTROPHILS NFR BLD: 82.3 % (ref 44–72)
NITRITE UR QL STRIP.AUTO: POSITIVE
NRBC # BLD AUTO: 0 K/UL
NRBC BLD-RTO: 0 /100 WBC
PH UR STRIP.AUTO: 6 [PH]
PLATELET # BLD AUTO: 185 K/UL (ref 164–446)
PMV BLD AUTO: 11.1 FL (ref 9–12.9)
POTASSIUM SERPL-SCNC: 3.5 MMOL/L (ref 3.6–5.5)
PROT SERPL-MCNC: 7.5 G/DL (ref 6–8.2)
PROT UR QL STRIP: NEGATIVE MG/DL
RBC # BLD AUTO: 4.53 M/UL (ref 4.2–5.4)
RBC # URNS HPF: ABNORMAL /HPF
RBC UR QL AUTO: ABNORMAL
SODIUM SERPL-SCNC: 140 MMOL/L (ref 135–145)
SP GR UR STRIP.AUTO: 1.02
UROBILINOGEN UR STRIP.AUTO-MCNC: 0.2 MG/DL
WBC # BLD AUTO: 12.1 K/UL (ref 4.8–10.8)
WBC #/AREA URNS HPF: ABNORMAL /HPF

## 2018-09-27 PROCEDURE — 87502 INFLUENZA DNA AMP PROBE: CPT

## 2018-09-27 PROCEDURE — 85025 COMPLETE CBC W/AUTO DIFF WBC: CPT

## 2018-09-27 PROCEDURE — 87077 CULTURE AEROBIC IDENTIFY: CPT

## 2018-09-27 PROCEDURE — 700102 HCHG RX REV CODE 250 W/ 637 OVERRIDE(OP): Performed by: EMERGENCY MEDICINE

## 2018-09-27 PROCEDURE — 36415 COLL VENOUS BLD VENIPUNCTURE: CPT

## 2018-09-27 PROCEDURE — 87040 BLOOD CULTURE FOR BACTERIA: CPT

## 2018-09-27 PROCEDURE — 87186 SC STD MICRODIL/AGAR DIL: CPT

## 2018-09-27 PROCEDURE — A9270 NON-COVERED ITEM OR SERVICE: HCPCS | Performed by: EMERGENCY MEDICINE

## 2018-09-27 PROCEDURE — 83605 ASSAY OF LACTIC ACID: CPT

## 2018-09-27 PROCEDURE — 80053 COMPREHEN METABOLIC PANEL: CPT

## 2018-09-27 PROCEDURE — 81001 URINALYSIS AUTO W/SCOPE: CPT

## 2018-09-27 PROCEDURE — 87086 URINE CULTURE/COLONY COUNT: CPT

## 2018-09-27 PROCEDURE — 700105 HCHG RX REV CODE 258: Performed by: EMERGENCY MEDICINE

## 2018-09-27 PROCEDURE — 71045 X-RAY EXAM CHEST 1 VIEW: CPT

## 2018-09-27 RX ORDER — NITROFURANTOIN 25; 75 MG/1; MG/1
100 CAPSULE ORAL 2 TIMES DAILY
Qty: 10 CAP | Refills: 0 | Status: SHIPPED | OUTPATIENT
Start: 2018-09-27 | End: 2018-09-27

## 2018-09-27 RX ORDER — NITROFURANTOIN 25; 75 MG/1; MG/1
100 CAPSULE ORAL ONCE
Status: COMPLETED | OUTPATIENT
Start: 2018-09-27 | End: 2018-09-27

## 2018-09-27 RX ORDER — NITROFURANTOIN 25; 75 MG/1; MG/1
100 CAPSULE ORAL 2 TIMES DAILY
Qty: 10 CAP | Refills: 0 | Status: SHIPPED | OUTPATIENT
Start: 2018-09-27 | End: 2018-10-02

## 2018-09-27 RX ORDER — SODIUM CHLORIDE 9 MG/ML
30 INJECTION, SOLUTION INTRAVENOUS ONCE
Status: COMPLETED | OUTPATIENT
Start: 2018-09-27 | End: 2018-09-27

## 2018-09-27 RX ADMIN — SODIUM CHLORIDE 2448 ML: 9 INJECTION, SOLUTION INTRAVENOUS at 01:00

## 2018-09-27 RX ADMIN — NITROFURANTOIN (MONOHYDRATE/MACROCRYSTALS) 100 MG: 75; 25 CAPSULE ORAL at 04:47

## 2018-09-27 ASSESSMENT — PAIN SCALES - GENERAL: PAINLEVEL_OUTOF10: 0

## 2018-09-27 NOTE — ED NOTES
Patient discharged home to self care, provided with paper copy of discharge instructions. Discussed discharge instructions and follow up appointments, patient verbalizes understanding. IV removed, bandage placed. Vital signs stable, escorted out to ED lobby.

## 2018-09-27 NOTE — ED NOTES
Pt updated om poc, pt visibly irritated and upset at time spent in ed. Pt educated on lab processing and interventions. Pt remains irritated, but nods with understanding.

## 2018-09-27 NOTE — ED PROVIDER NOTES
ED Provider Note    ED Provider Note    Primary care provider: Chacorta Jacobo M.D.  Means of arrival: walk in  History obtained from: Patient    CHIEF COMPLAINT  Chief Complaint   Patient presents with   • Flu Like Symptoms   • Sore Throat     Seen at 12:30 AM.   HPI  Bharti Zabala is a 63 y.o. female with MS who presents to the Emergency Department with 2 hours of subjective fevers and shaking chills.  She recently returned from [place] where she was spending time with young family members, she thinks that it is possible she had a flulike illness.  Otherwise, she has no specific complaints.    She denies any recent headache, neck pain, visual changes, chest pain, shortness of breath, cough, abdominal pain, nausea, vomiting, diarrhea, dysuria, rash, impaired immunity or incontinence.  She had a mild sore throat.    REVIEW OF SYSTEMS  See HPI,   Remainder of ROS negative.     PAST MEDICAL HISTORY   has a past medical history of Anesthesia; Bowel habit changes; Diabetes (HCC) (2017); High cholesterol; MS (multiple sclerosis) (HCC); Pain (2017); Urinary bladder disorder; and Urinary incontinence (2017).    SURGICAL HISTORY   has a past surgical history that includes cholecystoenterostomy (1992); primary c section (1991); and knee unicompartmental (Left, 5/23/2017).    SOCIAL HISTORY  Social History   Substance Use Topics   • Smoking status: Never Smoker   • Smokeless tobacco: Never Used   • Alcohol use No      History   Drug Use No       FAMILY HISTORY  Family History   Problem Relation Age of Onset   • Cancer Other        CURRENT MEDICATIONS  Reviewed.  See Encounter Summary.     ALLERGIES  Allergies   Allergen Reactions   • Sulfa Drugs Rash             PHYSICAL EXAM  VITAL SIGNS: /71   Pulse (!) 102   Temp 37.6 °C (99.6 °F)   Resp 18   Wt 81.6 kg (180 lb)   SpO2 98%   BMI 28.19 kg/m²   Constitutional: Awake, alert in no apparent distress.  HENT: Normocephalic, Bilateral external ears normal. Nose  normal.  Posterior pharynx within normal limits.  Eyes: Conjunctiva normal, non-icteric, EOMI.    Thorax & Lungs: Easy unlabored respirations, Clear to ascultation bilaterally.  Cardiovascular: Tachycardic, No murmurs, rubs or gallops.  Abdomen:  Soft, nontender, nondistended, normal active bowel sounds.   :    Skin: Visualized skin is  Dry, No erythema, No rash.   Musculoskeletal:   No cyanosis, clubbing or edema.  Neurologic: Alert, Grossly non-focal.   Psychiatric: Normal affect, Normal mood  Lymphatic:  No cervical LAD        RADIOLOGY  DX-CHEST-PORTABLE (1 VIEW)   Final Result      No acute cardiac or pulmonary abnormalities are identified.            COURSE & MEDICAL DECISION MAKING  Pertinent Labs & Imaging studies reviewed. (See chart for details)    Differential diagnoses include but are not limited to: Sepsis, UTI, pneumonia, influenza, viral syndrome    12:30 AM - Medical record reviewed, patient seen and examined at bedside.  The patient will be given IV fluids for borderline tachycardia, possible sepsis    4:07 AM-IV fluids have completed, tachycardia has improved.  I explained the test results and treatment plan  Decision Making:  This is a 63 y.o. year old female who presents with very short duration of subjective fevers and chills.  She is afebrile on arrival though borderline tachycardic.  She has a slight leukocytosis without bandemia.    Chest x-ray is normal, influenza is negative.    Patient does perform intermittent cath due to MS, therefore urinary tract infection was of significant concern.  The patient does have a nitrate positive UTI with moderate bacteria but few WBCs.  Urine culture has been ordered, the patient will be started on Macrobid.  She has a follow-up with her urologist today at 10 AM which is reasonable.    At this time she is stable for discharge.  She is directed to return for any fevers, lightheadedness, syncope or any other concern.    The patient's blood pressure is  elevated today. >120/80. I have referred them to primary care for follow up.       Discharge Medications:  Current Discharge Medication List      START taking these medications    Details   nitrofurantoin monohydr macro (MACROBID) 100 MG Cap Take 1 Cap by mouth 2 times a day for 5 days.  Qty: 10 Cap, Refills: 0             The patient was discharged home (see d/c instructions) and parent was told to return immediately for any signs or symptoms listed, or any worsening at all.  The patient's parent verbally agreed to the discharge precautions and follow-up plan which is documented in EPIC.        FINAL IMPRESSION  1. Acute UTI

## 2018-09-27 NOTE — ED NOTES
Chief Complaint   Patient presents with   • Flu Like Symptoms   • Sore Throat     /71   Pulse (!) 102   Temp 37.6 °C (99.6 °F)   Resp 18   Wt 81.6 kg (180 lb)   SpO2 94%   BMI 28.19 kg/m²       Pt has had chills, for the past two hours, slight nausea no vomiting.   Has had runny nose, and sore throat. No SOB, diarrhea or dizziness.     Pt placed into senior lounge, asked to notify staff of any changes.

## 2018-09-27 NOTE — ED NOTES
Spoke with lab in regards to urine microscopic, per lab: urine is being placed on microscope now. ERP informed

## 2018-10-02 LAB
BACTERIA BLD CULT: NORMAL
BACTERIA BLD CULT: NORMAL
SIGNIFICANT IND 70042: NORMAL
SIGNIFICANT IND 70042: NORMAL
SITE SITE: NORMAL
SITE SITE: NORMAL
SOURCE SOURCE: NORMAL
SOURCE SOURCE: NORMAL

## 2019-01-17 ENCOUNTER — OFFICE VISIT (OUTPATIENT)
Dept: NEUROLOGY | Facility: MEDICAL CENTER | Age: 64
End: 2019-01-17
Payer: COMMERCIAL

## 2019-01-17 VITALS
SYSTOLIC BLOOD PRESSURE: 116 MMHG | WEIGHT: 173 LBS | HEART RATE: 93 BPM | BODY MASS INDEX: 27.15 KG/M2 | TEMPERATURE: 97.8 F | HEIGHT: 67 IN | DIASTOLIC BLOOD PRESSURE: 64 MMHG | OXYGEN SATURATION: 96 %

## 2019-01-17 DIAGNOSIS — G35 MS (MULTIPLE SCLEROSIS) (HCC): ICD-10-CM

## 2019-01-17 PROCEDURE — 99214 OFFICE O/P EST MOD 30 MIN: CPT | Performed by: PSYCHIATRY & NEUROLOGY

## 2019-01-17 ASSESSMENT — PATIENT HEALTH QUESTIONNAIRE - PHQ9: CLINICAL INTERPRETATION OF PHQ2 SCORE: 0

## 2019-01-17 NOTE — PROGRESS NOTES
"CHIEF COMPLAINT  Chief Complaint   Patient presents with   • Follow-Up     ms       HPI  Bharti Zabala is a 62 y.o. female who presents to [facility] for her multiple sclerosis treatment.  MS (multiple sclerosis)  Pt is doing well with the copaxone. Pt has been stable since the last visit and working with [facility] has helped. Pt is noticed that her balance is better. Right leg gets tired and weak with more exercise. Pt has lost 45 pounds and she feels better. She lost the weight with weight watchers. Pt has bowel and bladder problems and she has started with the Miralax which has helped. Pt self caths 2x a day to totally empty her bladder to prevent infections in her bladder.    REVIEW OF SYSTEMS  Pertinent Positives: Fatigue, occasional headaches, cognitive decline   All other systems are negative.     PAST MEDICAL HISTORY  Past Medical History:   Diagnosis Date   • Anesthesia     \"During my  all my blood went to my feet, during spinal\"   • Bowel habit changes     constipation   • Diabetes (formerly [facility]) 2017    Diet controlled   • High cholesterol    • MS (multiple sclerosis) (formerly [facility])    • Pain 2017    left knee   • Urinary bladder disorder     \"Hx of ongoing bladder infections, none at the moment \"   • Urinary incontinence 2017    Pt uses straight cath bid, \"can't totally empty bladder due to MS\"       SOCIAL HISTORY  Social History     Social History   • Marital status:      Spouse name: N/A   • Number of children: N/A   • Years of education: N/A     Occupational History   • Not on file.     Social History Main Topics   • Smoking status: Never Smoker   • Smokeless tobacco: Never Used   • Alcohol use No   • Drug use: No   • Sexual activity: Not on file     Other Topics Concern   • Not on file     Social History Narrative   • No narrative on file       SURGICAL HISTORY  Past Surgical History:   Procedure Laterality Date   • KNEE UNICOMPARTMENTAL Left 2017    Procedure: KNEE UNICOMPARTMENTAL - " "[facility];  Surgeon: Tim Horn M.D.;  Location: SURGERY [facility];  Service:    • PB CHOLECYSTOENTEROSTOMY  1992   • PRIMARY C SECTION  1991       CURRENT MEDICATIONS  Current Outpatient Prescriptions   Medication Sig Dispense Refill   • COPAXONE 40 MG/ML Solution Prefilled Syringe INJECT 40 MG UNDER THE SKIN EVERY MONDAY, WEDNESDAY, AND FRIDAY 12 mL 12   • Cranberry 450 MG Cap Take  by mouth.     • methenamine hip (HIPPREX) 1 GM Tab Take 1 g by mouth 2 Times a Day.     • oxybutynin SR (DITROPAN-XL) 5 MG TB24 Take 5 mg by mouth every morning.     • atorvastatin (LIPITOR) 20 MG TABS Take 20 mg by mouth every evening.     • predniSONE (DELTASONE) 10 MG TABS Take 10 mg by mouth every day. Take with food      • vitamin D (CHOLECALCIFEROL) 1000 UNIT TABS Take 5,000 Units by mouth every bedtime.       No current facility-administered medications for this visit.        ALLERGIES  Allergies   Allergen Reactions   • Sulfa Drugs Rash             PHYSICAL EXAM  VITAL SIGNS: /64 (BP Location: Left arm, Patient Position: Sitting, BP Cuff Size: Adult)   Pulse 93   Temp 36.6 °C (97.8 °F) (Temporal)   Ht 1.702 m (5' 7\")   Wt 78.5 kg (173 lb)   SpO2 96%   BMI 27.10 kg/m²   Constitutional: Well developed, Well nourished, No acute distress, Non-toxic appearance.   HENT: Normocephalic atraumatic  Eyes: Fundi disc pale  Neck: Normal range of motion, No tenderness, Supple, No stridor.   Cardiovascular: Normal heart rate, Normal rhythm, No murmurs, No rubs, No gallops.   Thorax & Lungs: Normal breath sounds, No respiratory distress, No wheezing, No chest tenderness.   Abdomen: Bowel sounds normal, Soft, No tenderness, No masses, No pulsatile masses.   Skin: Warm, Dry, No erythema, No rash.   Back: No tenderness, No CVA tenderness.   Extremities: Intact distal pulses, No edema, No tenderness, No cyanosis, No clubbing.   Neurologic: Gait and station are normal say for a little bit of limping due   to her " collateral ligament injury.  Romberg is negative.  Strength in the   upper and lower extremities is normal.  Plantar responses are equivocal.  She   can do finger-to-nose, heel-to-shin well.  Fine finger movements are done   well.  She has a little sustained nystagmus on lateral gaze.  Pupils equal,   round and reactive to light.  Fundi appeared to be in normal.  Range of motion   of the neck is normal.  No facial weakness or facial sensory deficit.  Pupils   are reactive to light and equal.  Psychiatric: Affect normal, Judgment normal, Mood normal.   Lab: Reviewed with patient in detail and as noted in results.    EEG  No abnormality    RADIOLOGY/PROCEDURES  MRI reviewed with the patient in the PACS system       Narrative      Pre-and postcontrast examination of the brain  3/18/2014 8:25 AM    HISTORY/REASON FOR EXAM:  Multiple sclerosis.      TECHNIQUE/EXAM DESCRIPTION:   MRI of the brain without and with contrast.    T1 sagittal, T2 fast spin-echo axial, T1 coronal, FLAIR coronal, Diffusion weighted and Apparent Diffusion Coefficient (ADC map) axial images were obtained of the whole brain. T1 post-contrast axial and T1 post-contrast coronal images were obtained..    The study was performed on a [employer] Signa 1.5 Charis MRI scanner.    20 mL gadolinium contrast was administered intravenously.    COMPARISON:  6/11/00    FINDINGS:     There are multiple T2 hyperintense lesions in the subcortical and periventricular white matter, brainstem and middle cerebellar peduncles. None of the lesions demonstrates contrast enhancement. Most of the lesions are hypointense on T1-weighted sequences. There are no new lesions.    There is no acute infarct. There is no acute or chronic parenchymal hemorrhage. The ventricles, cortical sulci and the basal cisterns appear prominent consistent with mild cerebral and cerebellar atrophy.    The visualized flow voids of the cerebral vasculature appear normal.  There is no large lesion  identified in the expected course of the intracranial portions of the cranial nerves.    The skull bones appear normal. The paranasal sinuses are clear. The extracranial soft tissue including orbits appear grossly normal.   Impression       1.  Multifocal T2 hyperintense lesions in the subcortical and periventricular white matter, brainstem and the bilateral middle cerebellar peduncles consistent with chronic plaques of multiple sclerosis. None of the lesions demonstrates contrast enhancement. Most of the lesions are hypointense on T1-weighted sequences. There are no new lesions.  2.  Mild cerebral atrophy.             ASSESSMENT AND PLAN  1. MS (multiple sclerosis) (CMS-[facility])  Plan to continue Copaxone at the current dose. Patient is going to have a repeat MRI scan of the brain to determine if there has been any progression of disease. Pt needs sedation and is medically cleared for sedation during her MRI. We discussed cognitive concerns in addition to gait stability. We discussed healthy diet and gentle regular exercise program. Vitamin supplementation with vitamin D and disease prevention was covered with the patient.    Follow-up with Dr. Staci CALZADA spent 25 minutes with this patient face to face, over fifty percent was spent counseling patient on their condition, best management practices, reviewing test results and risks and benefits of treatment.

## 2019-01-17 NOTE — ASSESSMENT & PLAN NOTE
Pt is doing well with the copaxone. Pt has been stable since the last visit and working with [facility] has helped. Pt is noticed that her balance is better. Right leg gets tired and weak with more exercise. Pt has lost 45 pounds and she feels better. She lost the weight with weight watchers. Pt has bowel and bladder problems and she has started with the Miralax which has helped. Pt self caths 2x a day to totally empty her bladder to prevent infections in her bladder.

## 2019-02-27 DIAGNOSIS — G35 MULTIPLE SCLEROSIS (HCC): ICD-10-CM

## 2019-02-27 NOTE — TELEPHONE ENCOUNTER
Was the patient seen in the last year in this department? Yes    Does patient have an active prescription for medications requested? Yes last seen by dr Bloch on 1/17/19. Next appt with dr Small 3/18/19    Received Request Via: Pharmacy    [phone] [facility] called requesting Rx Copaxone to be faxed to them [phone]. Thank you

## 2019-02-28 RX ORDER — GLATIRAMER ACETATE 40 MG/ML
40 INJECTION, SOLUTION SUBCUTANEOUS
Qty: 12 ML | Refills: 11 | Status: SHIPPED | OUTPATIENT
Start: 2019-02-28 | End: 2020-02-28

## 2019-03-18 ENCOUNTER — OFFICE VISIT (OUTPATIENT)
Dept: NEUROLOGY | Facility: MEDICAL CENTER | Age: 64
End: 2019-03-18
Payer: COMMERCIAL

## 2019-03-18 VITALS
SYSTOLIC BLOOD PRESSURE: 104 MMHG | DIASTOLIC BLOOD PRESSURE: 62 MMHG | OXYGEN SATURATION: 97 % | HEIGHT: 67 IN | TEMPERATURE: 97.8 F | BODY MASS INDEX: 26.68 KG/M2 | WEIGHT: 170 LBS | HEART RATE: 86 BPM

## 2019-03-18 DIAGNOSIS — G35 MS (MULTIPLE SCLEROSIS) (HCC): ICD-10-CM

## 2019-03-18 DIAGNOSIS — G62.9 NEUROPATHY: ICD-10-CM

## 2019-03-18 PROCEDURE — 99215 OFFICE O/P EST HI 40 MIN: CPT | Performed by: PSYCHIATRY & NEUROLOGY

## 2019-03-18 RX ORDER — GABAPENTIN 100 MG/1
100 CAPSULE ORAL 3 TIMES DAILY
Qty: 21 CAP | Refills: 0 | Status: SHIPPED | OUTPATIENT
Start: 2019-03-18 | End: 2019-04-01 | Stop reason: SDUPTHER

## 2019-03-18 NOTE — ASSESSMENT & PLAN NOTE
Plan:  1.  Prescription given for gabapentin 100 mg tablets which she can start once a day and titrate up to 3 times per day as needed.  Side effects discussed including drowsiness, mental fogginess, and Toledo-Sid syndrome.

## 2019-03-18 NOTE — ASSESSMENT & PLAN NOTE
Ms. Zabaal is a 64-year-old woman with a past medical history of congenital adrenal hyperplasia on chronic daily low dose prednisone diagnosed with multiple sclerosis 35 years ago, treated with Copaxone.  She is clinically stable and today we reviewed her most recent MRI imaging from July 16, 2018.  We compared this to her most recent scans from 2014 directly.  She has extensive cervical spinal cord involvement and a moderate degree of periventricular subcortical white matter lesions, none of which were active.  There is been no significant change in the past 5 years.  We agreed to continue her on Copaxone.     Plan:  1.  Continue Copaxone 40 mg 3 times a week.  2.  Continue vitamin D supplementation

## 2019-03-18 NOTE — PROGRESS NOTES
"CC: Multiple Sclerosis       HPI:    Bharti Zabala is a pleasant 64 y.o. female who presents today in neurologic follow up for multiple sclerosis. The patient is currently being treated with Copaxone. She denies any symptoms of recent relapse.    She is currently in physical therapy for her balance.  She suffers from chronic neuropathy, pins and needles sensation in her bilateral feet worse with walking.  She has never tried any medications for this.    She has had chronic constipation and had a recent colonoscopy which was negative except for 2 small polyps.  She takes a prescription strength MiraLAX.     MS History:  Diagnosed: 35 years ago  Lumbar puncture:    First presentation: Left arm and leg tingling numbness.  Disease modifying treatment:    Current: Copaxone   Previous:   Former or other neurologist: Dr. Melissa Bloch  Last attack:   Last MRI:  18      ROS:   Constitutional: No fevers or chills.  Eyes: No blurry vision or eye pain.  ENT: No dysphagia or hearing loss.  Respiratory: No cough or shortness of breath.  Cardiovascular: No chest pain or palpitations.  GI: No nausea, vomiting, or diarrhea. + chronic constipation.  : No urinary incontinence or dysuria.  Musculoskeletal: No joint swelling or arthralgias.  Skin: No skin rashes.  Neuro: No headaches, dizziness, or tremors.  Endocrine: No heat or cold intolerance. No polydipsia or polyuria.  Psych: No depression or anxiety.  Heme/Lymph: No easy bruising or swollen lymph nodes.  All other review of systems were reviewed and were negative.     Past Medical History:   Past Medical History:   Diagnosis Date   • Diabetes (HCC) 2017    Diet controlled   • Pain 2017    left knee   • Urinary incontinence 2017    Pt uses straight cath bid, \"can't totally empty bladder due to MS\"   • Anesthesia     \"During my  all my blood went to my feet, during spinal\"   • Bowel habit changes     constipation   • High cholesterol    • MS (multiple " "sclerosis) (HCC)    • Urinary bladder disorder     \"Hx of ongoing bladder infections, none at the moment 5/17\"       Past Surgical History:   Past Surgical History:   Procedure Laterality Date   • KNEE UNICOMPARTMENTAL Left 5/23/2017    Procedure: KNEE UNICOMPARTMENTAL - [facility] [place];  Surgeon: Tim Horn M.D.;  Location: SURGERY [facility];  Service:    • PB CHOLECYSTOENTEROSTOMY  1992   • PRIMARY C SECTION  1991       Social History:   Social History     Social History   • Marital status:      Spouse name: N/A   • Number of children: N/A   • Years of education: N/A     Occupational History   • Not on file.     Social History Main Topics   • Smoking status: Never Smoker   • Smokeless tobacco: Never Used   • Alcohol use No   • Drug use: No   • Sexual activity: Not on file     Other Topics Concern   • Not on file     Social History Narrative   • No narrative on file       Family Hx:   Family History   Problem Relation Age of Onset   • Cancer Other        Current Medications:   Current Outpatient Prescriptions:   •  gabapentin (NEURONTIN) 100 MG Cap, Take 1 Cap by mouth 3 times a day for 7 days., Disp: 21 Cap, Rfl: 0  •  COPAXONE 40 MG/ML Solution Prefilled Syringe, Inject 40 mg as instructed 3 times a week., Disp: 12 mL, Rfl: 11  •  Cranberry 450 MG Cap, Take  by mouth., Disp: , Rfl:   •  methenamine hip (HIPPREX) 1 GM Tab, Take 1 g by mouth 2 Times a Day., Disp: , Rfl:   •  oxybutynin SR (DITROPAN-XL) 5 MG TB24, Take 5 mg by mouth every morning., Disp: , Rfl:   •  atorvastatin (LIPITOR) 20 MG TABS, Take 20 mg by mouth every evening., Disp: , Rfl:   •  predniSONE (DELTASONE) 10 MG TABS, Take 10 mg by mouth every day. Take with food , Disp: , Rfl:   •  vitamin D (CHOLECALCIFEROL) 1000 UNIT TABS, Take 5,000 Units by mouth every bedtime., Disp: , Rfl:     Allergies:   Allergies   Allergen Reactions   • Sulfa Drugs Rash               Physical Exam:   Ambulatory Vitals  Encounter Vitals  Temperature: " "36.6 °C (97.8 °F)  Temp src: Temporal  Blood Pressure: 104/62  Pulse: 86  Pulse Oximetry: 97 %  Weight: 77.1 kg (170 lb)  Height: 170.2 cm (5' 7\")  BMI (Calculated): 26.63  Constitutional: Well-developed, well-nourished, good hygiene. Appears stated age.  Cardiovascular: RRR, with no murmurs, rubs or gallops. No carotid bruits. No peripheral edema, pedal pulses intact.   Respiratory: Lungs CTA B/L, no W/R/R.   Skin: Warm, dry, intact. No rashes observed.  Eyes: Right eye sclera slightly injected.  Neurologic:   Mental Status: Awake, alert, oriented x 3.   Speech: Fluent with normal prosody.   Memory: Able to recall recent and remote events accurately.    Concentration: Attentive. Able to focus on history and follow multi-step commands.   Fund of Knowledge: Appropriate.   Cranial Nerves:    CN II: PERRL. No afferent pupillary defect.    CN III, IV, VI: Slight right eyelid ptosis.  Good eye closure, EOMI.     CN V: Facial sensation intact and symmetric.     CN VII: No facial asymmetry.     CN VIII: Hearing intact.     CN IX and X: Palate elevates symmetrically. Normal gag reflex.    CN XI: Symmetric shoulder shrug.     CN XII: Tongue midline.    Sensory: Decreased vibration sensation in the right great toe.   Coordination: No evidence of past-pointing on finger to nose testing, no dysdiadochokinesia. Heel to shin intact.    DTR's: 3+ throughout without clonus.    Babinski: Toes downgoing bilaterally.   Movements: No tremors observed.   Musculoskeletal:    Strength:   Deltoids      R 5/5 L 5/5  Biceps        R 5/5 L 5/5  Triceps       R 5/5 L 5/5  Wrist Ext    R 5/5 L 5/5  Finger Flex R 5/5 L 5/5  Finger Ext   R 5/5 L 5/5  Hip Flex      R 4/5 L 5/5  Knee Flex   R 4/5 L 5/5  Knee Ext    R 4/5 L 5/5  Ankle DF    R 4/5 L 5/5  Ankle PF    R 5/5 L 5/5   Gait: Spastic.    Tone: Normal bulk and tone.   Joints: No swelling.       Imaging:   Today I reviewed the patient's most recent MRI images with her in the examination room. " "I explained basic terminology of MRI's, verbalized my assessment, and answered her questions.     MRI Brain w/wo contrast from 7/26/18  1.  Numerous chronic stable areas of demyelination in the immediate periventricular white matter consistent with the patient's known diagnosis of multiple sclerosis.  2.  No new lesions in the brain parenchyma and no \"active\" enhancing lesions in the brain parenchyma.    MRI C-Spine w/wo contrast from 7/26/18  1.  Stable enhanced MRI of the cervical spine  2.  Numerous nonenhancing white matter lesions in the cord and posterior fossa are compatible with chronic demyelinating plaques. No new lesions are identified and none of these enhance  3.  Mild cervical spondylosis results in at most mild C6/7 and the left C3/4 foraminal stenoses      Assessment/Plan:  MS (multiple sclerosis)  Ms. Zabala is a 64-year-old woman with a past medical history of congenital adrenal hyperplasia on chronic daily low dose prednisone diagnosed with multiple sclerosis 35 years ago, treated with Copaxone.  She is clinically stable and today we reviewed her most recent MRI imaging from July 16, 2018.  We compared this to her most recent scans from 2014 directly.  She has extensive cervical spinal cord involvement and a moderate degree of periventricular subcortical white matter lesions, none of which were active.  There is been no significant change in the past 5 years.  We agreed to continue her on Copaxone.     Plan:  1.  Continue Copaxone 40 mg 3 times a week.  2.  Continue vitamin D supplementation    Neuropathy (HCC)  Plan:  1.  Prescription given for gabapentin 100 mg tablets which she can start once a day and titrate up to 3 times per day as needed.  Side effects discussed including drowsiness, mental fogginess, and Toledo-Sid syndrome.      Greater than 50% of this 40 minute face to face follow up encounter was devoted to disease state counseling on Multiple Sclerosis and coordination of care. " (see above assessment and plan for further details of discussion).       Micki Small D.O., M.P.H  MS specialist.   Board Certified Neurologist.  Neurology Clerkship Director, [facility].    Neurology,  [facility].   Tel: [phone]  Fax: [phone]

## 2019-04-01 DIAGNOSIS — G62.9 NEUROPATHY: ICD-10-CM

## 2019-04-03 RX ORDER — GABAPENTIN 100 MG/1
CAPSULE ORAL
Qty: 21 CAP | Refills: 0 | Status: SHIPPED | OUTPATIENT
Start: 2019-04-03 | End: 2020-01-27

## 2019-05-31 ENCOUNTER — HOSPITAL ENCOUNTER (OUTPATIENT)
Dept: RADIOLOGY | Facility: MEDICAL CENTER | Age: 64
End: 2019-05-31
Attending: OBSTETRICS & GYNECOLOGY
Payer: COMMERCIAL

## 2019-05-31 DIAGNOSIS — Z12.31 VISIT FOR SCREENING MAMMOGRAM: ICD-10-CM

## 2019-05-31 PROCEDURE — 77063 BREAST TOMOSYNTHESIS BI: CPT

## 2019-09-05 ENCOUNTER — HOSPITAL ENCOUNTER (OUTPATIENT)
Dept: RADIOLOGY | Facility: MEDICAL CENTER | Age: 64
End: 2019-09-05
Attending: FAMILY MEDICINE
Payer: COMMERCIAL

## 2019-09-05 DIAGNOSIS — R10.11 ABDOMINAL PAIN, RIGHT UPPER QUADRANT: ICD-10-CM

## 2019-09-05 PROCEDURE — 76700 US EXAM ABDOM COMPLETE: CPT

## 2019-10-22 ENCOUNTER — APPOINTMENT (RX ONLY)
Dept: URBAN - METROPOLITAN AREA CLINIC 4 | Facility: CLINIC | Age: 64
Setting detail: DERMATOLOGY
End: 2019-10-22

## 2019-10-22 PROBLEM — C44.311 BASAL CELL CARCINOMA OF SKIN OF NOSE: Status: ACTIVE | Noted: 2019-10-22

## 2019-10-22 PROCEDURE — ? MOHS SURGERY PHONE CONSULTATION

## 2019-10-22 PROCEDURE — ? PATIENT SPECIFIC COUNSELING

## 2019-10-22 NOTE — PROCEDURE: MOHS SURGERY PHONE CONSULTATION
Does The Patient Have A Pacemaker Or Defibrillator?: No
Has The Patient Ever Had A Joint Replaced?: Yes
Which Antibiotic Do They Take For Surgical Prophylaxis?: Amoxicillin (2 grams)
Detail Level: Detailed
Referring Provider: Dr Maxi Hall
Time Of Mohs Surgery: 8:30 am
If Yes- Additional Details: 05/2017 Partial Left Knee
Patient Reported Location: Left side of nose
Patient's Insurance: [employer]
Date Of Mohs Surgery: 11/04/2019
Office Location Of Mohs Surgery: [place] [id number]

## 2019-11-04 ENCOUNTER — APPOINTMENT (RX ONLY)
Dept: URBAN - METROPOLITAN AREA CLINIC 36 | Facility: CLINIC | Age: 64
Setting detail: DERMATOLOGY
End: 2019-11-04

## 2019-11-04 PROBLEM — E78.5 HYPERLIPIDEMIA, UNSPECIFIED: Status: ACTIVE | Noted: 2019-11-04

## 2019-11-04 PROBLEM — C44.311 BASAL CELL CARCINOMA OF SKIN OF NOSE: Status: ACTIVE | Noted: 2019-11-04

## 2019-11-04 PROCEDURE — 17312 MOHS ADDL STAGE: CPT

## 2019-11-04 PROCEDURE — 17311 MOHS 1 STAGE H/N/HF/G: CPT

## 2019-11-04 PROCEDURE — ? MOHS SURGERY

## 2019-11-04 NOTE — PROCEDURE: MOHS SURGERY
Stage 6: Additional Anesthesia Volume In Cc: 0
Double O-Z Flap Text: The defect edges were debeveled with a #15 scalpel blade.  Given the location of the defect, shape of the defect and the proximity to free margins a Double O-Z flap was deemed most appropriate.  Using a sterile surgical marker, an appropriate transposition flap was drawn incorporating the defect and placing the expected incisions within the relaxed skin tension lines where possible. The area thus outlined was incised deep to adipose tissue with a #15 scalpel blade.  The skin margins were undermined to an appropriate distance in all directions utilizing iris scissors.
Graft Basting Suture (Optional): 5-0 Fast Absorbing Gut
Show Previous Accession Variable: Yes
Cheek Interpolation Flap Text: A decision was made to reconstruct the defect utilizing an interpolation axial flap and a staged reconstruction.  A telfa template was made of the defect.  This telfa template was then used to outline the Cheek Interpolation flap.  The donor area for the pedicle flap was then injected with anesthesia.  The flap was excised through the skin and subcutaneous tissue down to the layer of the underlying musculature.  The interpolation flap was carefully excised within this deep plane to maintain its blood supply.  The edges of the donor site were undermined.   The donor site was closed in a primary fashion.  The pedicle was then rotated into position and sutured.  Once the tube was sutured into place, adequate blood supply was confirmed with blanching and refill.  The pedicle was then wrapped with xeroform gauze and dressed appropriately with a telfa and gauze bandage to ensure continued blood supply and protect the attached pedicle.
Stage 11: Additional Anesthesia Type: 1% lidocaine with epinephrine
Validate Mohs Case Number (Can Hide Mohs Case Number In Settings Tab): No
Epidermal Sutures: 5-0 Ethilon
Purse String (Intermediate) Text: Given the location of the defect and the characteristics of the surrounding skin a purse string intermediate closure was deemed most appropriate.  Undermining was performed circumfirentially around the surgical defect.  A purse string suture was then placed and tightened.
Bilobed Transposition Flap Text: The defect edges were debeveled with a #15 scalpel blade.  Given the location of the defect and the proximity to free margins a bilobed transposition flap was deemed most appropriate.  Using a sterile surgical marker, an appropriate bilobe flap drawn around the defect.    The area thus outlined was incised deep to adipose tissue with a #15 scalpel blade.  The skin margins were undermined to an appropriate distance in all directions utilizing iris scissors.
Secondary Intention Text (Leave Blank If You Do Not Want): The defect will heal with secondary intention.
Closure 3 Information: This tab is for additional flaps and grafts above and beyond our usual structured repairs.  Please note if you enter information here it will not currently bill and you will need to add the billing information manually.
Mid-Level Procedure Text (F): After obtaining clear surgical margins the patient was sent to a mid-level provider for surgical repair.  The patient understands they will receive post-surgical care and follow-up from the mid-level provider.
Number Of Stages: 2
Repair Anesthesia Method: local infiltration
Consent 1/Introductory Paragraph: The rationale for Mohs was explained to the patient and consent was obtained. The risks, benefits and alternatives to therapy were discussed in detail. Specifically, the risks of infection, scarring, bleeding, prolonged wound healing, incomplete removal, allergy to anesthesia, nerve injury and recurrence were addressed. Prior to the procedure, the treatment site was clearly identified and confirmed by the patient. All components of Universal Protocol/PAUSE Rule completed.
Modified Advancement Flap Text: The defect edges were debeveled with a #15 scalpel blade.  Given the location of the defect, shape of the defect and the proximity to free margins a modified advancement flap was deemed most appropriate.  Using a sterile surgical marker, an appropriate advancement flap was drawn incorporating the defect and placing the expected incisions within the relaxed skin tension lines where possible.    The area thus outlined was incised deep to adipose tissue with a #15 scalpel blade.  The skin margins were undermined to an appropriate distance in all directions utilizing iris scissors.
Inflammation Suggestive Of Cancer Camouflage Histology Text: There was a dense lymphocytic infiltrate which prevented adequate histologic evaluation of adjacent structures.
Mastoid Interpolation Flap Text: A decision was made to reconstruct the defect utilizing an interpolation axial flap and a staged reconstruction.  A telfa template was made of the defect.  This telfa template was then used to outline the mastoid interpolation flap.  The donor area for the pedicle flap was then injected with anesthesia.  The flap was excised through the skin and subcutaneous tissue down to the layer of the underlying musculature.  The pedicle flap was carefully excised within this deep plane to maintain its blood supply.  The edges of the donor site were undermined.   The donor site was closed in a primary fashion.  The pedicle was then rotated into position and sutured.  Once the tube was sutured into place, adequate blood supply was confirmed with blanching and refill.  The pedicle was then wrapped with xeroform gauze and dressed appropriately with a telfa and gauze bandage to ensure continued blood supply and protect the attached pedicle.
Mohs Histo Method Verbiage: Each section was then chromacoded and processed in the Mohs lab using the Mohs protocol and submitted for frozen section.
Island Pedicle Flap With Canthal Suspension Text: The defect edges were debeveled with a #15 scalpel blade.  Given the location of the defect, shape of the defect and the proximity to free margins an island pedicle advancement flap was deemed most appropriate.  Using a sterile surgical marker, an appropriate advancement flap was drawn incorporating the defect, outlining the appropriate donor tissue and placing the expected incisions within the relaxed skin tension lines where possible. The area thus outlined was incised deep to adipose tissue with a #15 scalpel blade.  The skin margins were undermined to an appropriate distance in all directions around the primary defect and laterally outward around the island pedicle utilizing iris scissors.  There was minimal undermining beneath the pedicle flap. A suspension suture was placed in the canthal tendon to prevent tension and prevent ectropion.
Plastic Surgeon Procedure Text (C): After obtaining clear surgical margins the patient was sent to plastics for surgical repair.  The patient understands they will receive post-surgical care and follow-up from the referring physician's office.
Unna Boot Text: An Unna boot was placed to help immobilize the limb and facilitate more rapid healing.
Tarsorrhaphy Text: A tarsorrhaphy was performed using Frost sutures.
Spiral Flap Text: The defect edges were debeveled with a #15 scalpel blade.  Given the location of the defect, shape of the defect and the proximity to free margins a spiral flap was deemed most appropriate.  Using a sterile surgical marker, an appropriate rotation flap was drawn incorporating the defect and placing the expected incisions within the relaxed skin tension lines where possible. The area thus outlined was incised deep to adipose tissue with a #15 scalpel blade.  The skin margins were undermined to an appropriate distance in all directions utilizing iris scissors.
Epidermal Closure Graft Donor Site (Optional): simple interrupted
Cheiloplasty (Complex) Text: A decision was made to reconstruct the defect with a  cheiloplasty.  The defect was undermined extensively.  Additional obicularis oris muscle was excised with a 15 blade scalpel.  The defect was converted into a full thickness wedge to facilite a better cosmetic result.  Small vessels were then tied off with 5-0 monocyrl. The obicularis oris, superficial fascia, adipose and dermis were then reapproximated.  After the deeper layers were approximated the epidermis was reapproximated with particular care given to realign the vermilion border.
Mohs Rapid Report Verbiage: The area of clinically evident tumor was marked with skin marking ink and appropriately hatched.  The initial incision was made following the Mohs approach through the skin.  The specimen was taken to the lab, divided into the necessary number of pieces, chromacoded and processed according to the Mohs protocol.  This was repeated in successive stages until a tumor free defect was achieved.
Anesthesia Volume In Cc: 6
Consent (Marginal Mandibular)/Introductory Paragraph: The rationale for Mohs was explained to the patient and consent was obtained. The risks, benefits and alternatives to therapy were discussed in detail. Specifically, the risks of damage to the marginal mandibular branch of the facial nerve, infection, scarring, bleeding, prolonged wound healing, incomplete removal, allergy to anesthesia, and recurrence were addressed. Prior to the procedure, the treatment site was clearly identified and confirmed by the patient. All components of Universal Protocol/PAUSE Rule completed.
Asc Procedure Text (C): After obtaining clear surgical margins the patient was sent to an ASC for surgical repair.  The patient understands they will receive post-surgical care and follow-up from the ASC physician.
Biopsy Photograph Reviewed: No (no photograph available)
Stage 2: Number Of Blocks?: 1
Advancement Flap (Double) Text: The defect edges were debeveled with a #15 scalpel blade.  Given the location of the defect and the proximity to free margins a double advancement flap was deemed most appropriate.  Using a sterile surgical marker, the appropriate advancement flaps were drawn incorporating the defect and placing the expected incisions within the relaxed skin tension lines where possible.    The area thus outlined was incised deep to adipose tissue with a #15 scalpel blade.  The skin margins were undermined to an appropriate distance in all directions utilizing iris scissors.
Unique Flap 3 Name: Mercedes Flap
O-T Plasty Text: The defect edges were debeveled with a #15 scalpel blade.  Given the location of the defect, shape of the defect and the proximity to free margins an O-T plasty was deemed most appropriate.  Using a sterile surgical marker, an appropriate O-T plasty was drawn incorporating the defect and placing the expected incisions within the relaxed skin tension lines where possible.    The area thus outlined was incised deep to adipose tissue with a #15 scalpel blade.  The skin margins were undermined to an appropriate distance in all directions utilizing iris scissors.
Cartilage Graft Text: The defect edges were debeveled with a #15 scalpel blade.  Given the location of the defect, shape of the defect, the fact the defect involved a full thickness cartilage defect a cartilage graft was deemed most appropriate.  An appropriate donor site was identified, cleansed, and anesthetized. The cartilage graft was then harvested and transferred to the recipient site, oriented appropriately and then sutured into place.  The secondary defect was then repaired using a primary closure.
Otolaryngologist Procedure Text (E): After obtaining clear surgical margins the patient was sent to otolaryngology for surgical repair.  The patient understands they will receive post-surgical care and follow-up from the referring physician's office.
Provider Procedure Text (A): After obtaining clear surgical margins the defect was repaired by another provider.
Oculoplastic Surgeon Procedure Text (B): After obtaining clear surgical margins the patient was sent to oculoplastics for surgical repair.  The patient understands they will receive post-surgical care and follow-up from the referring physician's office.
Consent (Lip)/Introductory Paragraph: The rationale for Mohs was explained to the patient and consent was obtained. The risks, benefits and alternatives to therapy were discussed in detail. Specifically, the risks of lip deformity, changes in the oral aperture, infection, scarring, bleeding, prolonged wound healing, incomplete removal, allergy to anesthesia, nerve injury and recurrence were addressed. Prior to the procedure, the treatment site was clearly identified and confirmed by the patient. All components of Universal Protocol/PAUSE Rule completed.
M-Plasty Complex Repair Preamble Text (Leave Blank If You Do Not Want): Extensive wide undermining was performed.
Rhombic Flap Text: The defect edges were debeveled with a #15 scalpel blade.  Given the location of the defect and the proximity to free margins a rhombic flap was deemed most appropriate.  Using a sterile surgical marker, an appropriate rhombic flap was drawn incorporating the defect.    The area thus outlined was incised deep to adipose tissue with a #15 scalpel blade.  The skin margins were undermined to an appropriate distance in all directions utilizing iris scissors.
Closure 2 Information: This tab is for additional flaps and grafts, including complex repair and grafts and complex repair and flaps. You can also specify a different location for the additional defect, if the location is the same you do not need to select a new one. We will insert the automated text for the repair you select below just as we do for solitary flaps and grafts. Please note that at this time if you select a location with a different insurance zone you will need to override the ICD10 and CPT if appropriate.
Eye Protection Verbiage: Before proceeding with the stage, a plastic scleral shield was inserted. The globe was anesthetized with a few drops of 1% lidocaine with 1:100,000 epinephrine. Then, an appropriate sized scleral shield was chosen and coated with lacrilube ointment. The shield was gently inserted and left in place for the duration of each stage. After the stage was completed, the shield was gently removed.
V-Y Plasty Text: The defect edges were debeveled with a #15 scalpel blade.  Given the location of the defect, shape of the defect and the proximity to free margins an V-Y advancement flap was deemed most appropriate.  Using a sterile surgical marker, an appropriate advancement flap was drawn incorporating the defect and placing the expected incisions within the relaxed skin tension lines where possible.    The area thus outlined was incised deep to adipose tissue with a #15 scalpel blade.  The skin margins were undermined to an appropriate distance in all directions utilizing iris scissors.
A-T Advancement Flap Text: The defect edges were debeveled with a #15 scalpel blade.  Given the location of the defect, shape of the defect and the proximity to free margins an A-T advancement flap was deemed most appropriate.  Using a sterile surgical marker, an appropriate advancement flap was drawn incorporating the defect and placing the expected incisions within the relaxed skin tension lines where possible.    The area thus outlined was incised deep to adipose tissue with a #15 scalpel blade.  The skin margins were undermined to an appropriate distance in all directions utilizing iris scissors.
Suturegard Body: The suture ends were repeatedly re-tightened and re-clamped to achieve the desired tissue expansion.
Unique Flap 3 Text: The defect edges were debeveled with a #15 scalpel blade.  Given the location of the defect, shape of the defect and the proximity to free margins a Mercedes (double advancement flap) was deemed most appropriate.  Using a sterile surgical marker, the appropriate transposition flaps were drawn incorporating the defect and placing the expected incisions within the relaxed skin tension lines where possible.    The area thus outlined was incised deep to adipose tissue with a #15 scalpel blade.  The skin margins were undermined to an appropriate distance in all directions utilizing iris scissors.  Hemostasis was achieved with electrocautery.  The flaps were then advanced into the defect and anchored with interrupted buried subcutaneous sutures.
Dressing (No Sutures): dry sterile dressing
Skin Substitute Text: The defect edges were debeveled with a #15 scalpel blade.  Given the location of the defect, shape of the defect and the proximity to free margins a skin substitute graft was deemed most appropriate.  The graft material was trimmed to fit the size of the defect. The graft was then placed in the primary defect and oriented appropriately.
Estimated Blood Loss (Cc): less than 5 cc
Bilateral Helical Rim Advancement Flap Text: The defect edges were debeveled with a #15 blade scalpel.  Given the location of the defect and the proximity to free margins (helical rim) a bilateral helical rim advancement flap was deemed most appropriate.  Using a sterile surgical marker, the appropriate advancement flaps were drawn incorporating the defect and placing the expected incisions between the helical rim and antihelix where possible.  The area thus outlined was incised through and through with a #15 scalpel blade.  With a skin hook and iris scissors, the flaps were gently and sharply undermined and freed up.
M-Plasty Intermediate Repair Preamble Text (Leave Blank If You Do Not Want): Undermining was performed with blunt dissection.
Cheek-To-Nose Interpolation Flap Text: A decision was made to reconstruct the defect utilizing an interpolation axial flap and a staged reconstruction.  A telfa template was made of the defect.  This telfa template was then used to outline the Cheek-To-Nose Interpolation flap.  The donor area for the pedicle flap was then injected with anesthesia.  The flap was excised through the skin and subcutaneous tissue down to the layer of the underlying musculature.  The interpolation flap was carefully excised within this deep plane to maintain its blood supply.  The edges of the donor site were undermined.   The donor site was closed in a primary fashion.  The pedicle was then rotated into position and sutured.  Once the tube was sutured into place, adequate blood supply was confirmed with blanching and refill.  The pedicle was then wrapped with xeroform gauze and dressed appropriately with a telfa and gauze bandage to ensure continued blood supply and protect the attached pedicle.
Area H Indication Text: Tumors in this location are included in Area H (eyelids, eyebrows, nose, lips, chin, ear, pre-auricular, post-auricular, temple, genitalia, hands, feet, ankles and areola).  Tissue conservation is critical in these anatomic locations.
Pain Refusal Text: I offered to prescribe pain medication but the patient refused to take this medication.
V-Y Flap Text: The defect edges were debeveled with a #15 scalpel blade.  Given the location of the defect, shape of the defect and the proximity to free margins a V-Y flap was deemed most appropriate.  Using a sterile surgical marker, an appropriate advancement flap was drawn incorporating the defect and placing the expected incisions within the relaxed skin tension lines where possible.    The area thus outlined was incised deep to adipose tissue with a #15 scalpel blade.  The skin margins were undermined to an appropriate distance in all directions utilizing iris scissors.
No Repair - Repaired With Adjacent Surgical Defect Text (Leave Blank If You Do Not Want): After obtaining clear surgical margins the defect was repaired concurrently with another surgical defect which was in close approximation.
Partial Purse String (Simple) Text: Given the location of the defect and the characteristics of the surrounding skin a simple purse string closure was deemed most appropriate.  Undermining was performed circumfirentially around the surgical defect.  A purse string suture was then placed and tightened. Wound tension only allowed a partial closure of the circular defect.
Trilobed Flap Text: The defect edges were debeveled with a #15 scalpel blade.  Given the location of the defect and the proximity to free margins a trilobed flap was deemed most appropriate.  Using a sterile surgical marker, an appropriate trilobed flap drawn around the defect.    The area thus outlined was incised deep to adipose tissue with a #15 scalpel blade.  The skin margins were undermined to an appropriate distance in all directions utilizing iris scissors.
Primary Defect Length In Cm (Final Defect Size - Required For Flaps/Grafts): 1.1
Which Provider Will Be Performing The Repair?: A
Postop Diagnosis: same
Consent 2/Introductory Paragraph: Mohs surgery was explained to the patient and consent was obtained. The risks, benefits and alternatives to therapy were discussed in detail. Specifically, the risks of infection, scarring, bleeding, prolonged wound healing, incomplete removal, allergy to anesthesia, nerve injury and recurrence were addressed. Prior to the procedure, the treatment site was clearly identified and confirmed by the patient. All components of Universal Protocol/PAUSE Rule completed.
Posterior Auricular Interpolation Flap Text: A decision was made to reconstruct the defect utilizing an interpolation axial flap and a staged reconstruction.  A telfa template was made of the defect.  This telfa template was then used to outline the posterior auricular interpolation flap.  The donor area for the pedicle flap was then injected with anesthesia.  The flap was excised through the skin and subcutaneous tissue down to the layer of the underlying musculature.  The pedicle flap was carefully excised within this deep plane to maintain its blood supply.  The edges of the donor site were undermined.   The donor site was closed in a primary fashion.  The pedicle was then rotated into position and sutured.  Once the tube was sutured into place, adequate blood supply was confirmed with blanching and refill.  The pedicle was then wrapped with xeroform gauze and dressed appropriately with a telfa and gauze bandage to ensure continued blood supply and protect the attached pedicle.
Mucosal Advancement Flap Text: Given the location of the defect, shape of the defect and the proximity to free margins a mucosal advancement flap was deemed most appropriate. Incisions were made with a 15 blade scalpel in the appropriate fashion along the cutaneous vermilion border and the mucosal lip. The remaining actinically damaged mucosal tissue was excised.  The mucosal advancement flap was then elevated to the gingival sulcus with care taken to preserve the neurovascular structures and advanced into the primary defect. Care was taken to ensure that precise realignment of the vermilion border was achieved.
Repair Type: Repair performed by another provider
Complex Repair And Flap Additional Text (Will Appearing After The Standard Complex Repair Text): The complex repair was not sufficient to completely close the primary defect. The remaining additional defect was repaired with the flap mentioned below.
Alar Island Pedicle Flap Text: The defect edges were debeveled with a #15 scalpel blade.  Given the location of the defect, shape of the defect and the proximity to the alar rim an island pedicle advancement flap was deemed most appropriate.  Using a sterile surgical marker, an appropriate advancement flap was drawn incorporating the defect, outlining the appropriate donor tissue and placing the expected incisions within the nasal ala running parallel to the alar rim. The area thus outlined was incised with a #15 scalpel blade.  The skin margins were undermined minimally to an appropriate distance in all directions around the primary defect and laterally outward around the island pedicle utilizing iris scissors.  There was minimal undermining beneath the pedicle flap.
Home Suture Removal Text: Patient was provided instructions on removing sutures and will remove their sutures at home.  If they have any questions or difficulties they will call the office.
Consent (Spinal Accessory)/Introductory Paragraph: The rationale for Mohs was explained to the patient and consent was obtained. The risks, benefits and alternatives to therapy were discussed in detail. Specifically, the risks of damage to the spinal accessory nerve, infection, scarring, bleeding, prolonged wound healing, incomplete removal, allergy to anesthesia, and recurrence were addressed. Prior to the procedure, the treatment site was clearly identified and confirmed by the patient. All components of Universal Protocol/PAUSE Rule completed.
Ear Wedge Repair Text: A wedge excision was completed by carrying down an excision through the full thickness of the ear and cartilage with an inward facing Burow's triangle. The wound was then closed in a layered fashion.
Complex Repair Preamble Text (Leave Blank If You Do Not Want): Extensive wide undermining was performed at least 2 cm in all directions.
Star Wedge Flap Text: The defect edges were debeveled with a #15 scalpel blade.  Given the location of the defect, shape of the defect and the proximity to free margins a star wedge flap was deemed most appropriate.  Using a sterile surgical marker, an appropriate rotation flap was drawn incorporating the defect and placing the expected incisions within the relaxed skin tension lines where possible. The area thus outlined was incised deep to adipose tissue with a #15 scalpel blade.  The skin margins were undermined to an appropriate distance in all directions utilizing iris scissors.
Consent (Near Eyelid Margin)/Introductory Paragraph: The rationale for Mohs was explained to the patient and consent was obtained. The risks, benefits and alternatives to therapy were discussed in detail. Specifically, the risks of ectropion or eyelid deformity, infection, scarring, bleeding, prolonged wound healing, incomplete removal, allergy to anesthesia, nerve injury and recurrence were addressed. Prior to the procedure, the treatment site was clearly identified and confirmed by the patient. All components of Universal Protocol/PAUSE Rule completed.
Wound Care: Aquaphor
Stage 3: Additional Anesthesia Type: 1% lidocaine with 1:100,000 epinephrine and 408mcg clindamycin/ml and a 1:10 solution of 8.4% sodium bicarbonate
Transposition Flap Text: The defect edges were debeveled with a #15 scalpel blade.  Given the location of the defect and the proximity to free margins a transposition flap was deemed most appropriate.  Using a sterile surgical marker, an appropriate transposition flap was drawn incorporating the defect.    The area thus outlined was incised deep to adipose tissue with a #15 scalpel blade.  The skin margins were undermined to an appropriate distance in all directions utilizing iris scissors.
Graft Donor Site Bandage (Optional-Leave Blank If You Don't Want In Note): Aquaphor and telefa placed on wound. Pressure dressing applied to donor site
Bcc Histology Text: There were numerous aggregates of basaloid cells.
Full Thickness Lip Wedge Repair (Flap) Text: Given the location of the defect and the proximity to free margins a full thickness wedge repair was deemed most appropriate.  Using a sterile surgical marker, the appropriate repair was drawn incorporating the defect and placing the expected incisions perpendicular to the vermilion border.  The vermilion border was also meticulously outlined to ensure appropriate reapproximation during the repair.  The area thus outlined was incised through and through with a #15 scalpel blade.  The muscularis and dermis were reaproximated with deep sutures following hemostasis. Care was taken to realign the vermilion border before proceeding with the superficial closure.  Once the vermilion was realigned the superfical and mucosal closure was finished.
O-Z Plasty Text: The defect edges were debeveled with a #15 scalpel blade.  Given the location of the defect, shape of the defect and the proximity to free margins an O-Z plasty (double transposition flap) was deemed most appropriate.  Using a sterile surgical marker, the appropriate transposition flaps were drawn incorporating the defect and placing the expected incisions within the relaxed skin tension lines where possible.    The area thus outlined was incised deep to adipose tissue with a #15 scalpel blade.  The skin margins were undermined to an appropriate distance in all directions utilizing iris scissors.  Hemostasis was achieved with electrocautery.  The flaps were then transposed into place, one clockwise and the other counterclockwise, and anchored with interrupted buried subcutaneous sutures.
Burow's Advancement Flap Text: The defect edges were debeveled with a #15 scalpel blade.  Given the location of the defect and the proximity to free margins a Burow's advancement flap was deemed most appropriate.  Using a sterile surgical marker, the appropriate advancement flap was drawn incorporating the defect and placing the expected incisions within the relaxed skin tension lines where possible.    The area thus outlined was incised deep to adipose tissue with a #15 scalpel blade.  The skin margins were undermined to an appropriate distance in all directions utilizing iris scissors.
Unique Flap 4 Name: Banner Flap
Rhomboid Transposition Flap Text: The defect edges were debeveled with a #15 scalpel blade.  Given the location of the defect and the proximity to free margins a rhomboid transposition flap was deemed most appropriate.  Using a sterile surgical marker, an appropriate rhomboid flap was drawn incorporating the defect.    The area thus outlined was incised deep to adipose tissue with a #15 scalpel blade.  The skin margins were undermined to an appropriate distance in all directions utilizing iris scissors.
Composite Graft Text: The defect edges were debeveled with a #15 scalpel blade.  Given the location of the defect, shape of the defect, the proximity to free margins and the fact the defect was full thickness a composite graft was deemed most appropriate.  The defect was outline and then transferred to the donor site.  A full thickness graft was then excised from the donor site. The graft was then placed in the primary defect, oriented appropriately and then sutured into place.  The secondary defect was then repaired using a primary closure.
Suturegard Retention Suture: 2-0 Nylon
Consent (Scalp)/Introductory Paragraph: The rationale for Mohs was explained to the patient and consent was obtained. The risks, benefits and alternatives to therapy were discussed in detail. Specifically, the risks of changes in hair growth pattern secondary to repair, infection, scarring, bleeding, prolonged wound healing, incomplete removal, allergy to anesthesia, nerve injury and recurrence were addressed. Prior to the procedure, the treatment site was clearly identified and confirmed by the patient. All components of Universal Protocol/PAUSE Rule completed.
O-T Advancement Flap Text: The defect edges were debeveled with a #15 scalpel blade.  Given the location of the defect, shape of the defect and the proximity to free margins an O-T advancement flap was deemed most appropriate.  Using a sterile surgical marker, an appropriate advancement flap was drawn incorporating the defect and placing the expected incisions within the relaxed skin tension lines where possible.    The area thus outlined was incised deep to adipose tissue with a #15 scalpel blade.  The skin margins were undermined to an appropriate distance in all directions utilizing iris scissors.
Donor Site Anesthesia Type: same as repair anesthesia
Mohs Method Verbiage: An incision at a 45 degree angle following the standard Mohs approach was done and the specimen was harvested as a microscopic controlled layer.
H Plasty Text: Given the location of the defect, shape of the defect and the proximity to free margins a H-plasty was deemed most appropriate for repair.  Using a sterile surgical marker, the appropriate advancement arms of the H-plasty were drawn incorporating the defect and placing the expected incisions within the relaxed skin tension lines where possible. The area thus outlined was incised deep to adipose tissue with a #15 scalpel blade. The skin margins were undermined to an appropriate distance in all directions utilizing iris scissors.  The opposing advancement arms were then advanced into place in opposite direction and anchored with interrupted buried subcutaneous sutures.
Unique Flap 4 Text: The defect edges were debeveled with a #15 scalpel blade.  Given the location of the defect and the proximity to free margins a Banner transposition flap was deemed most appropriate.  Using a sterile surgical marker, an appropriate Banner transposition flap was drawn incorporating the defect.    The area thus outlined was incised deep to adipose tissue with a #15 scalpel blade.  The skin margins were undermined to an appropriate distance in all directions utilizing iris scissors.
Surgical Defect Length In Cm (Optional): 0.9
Length To Time In Minutes Device Was In Place: 10
Surgeon Performing Repair (Optional): Dr Hall
Tissue Cultured Epidermal Autograft Text: The defect edges were debeveled with a #15 scalpel blade.  Given the location of the defect, shape of the defect and the proximity to free margins a tissue cultured epidermal autograft was deemed most appropriate.  The graft was then trimmed to fit the size of the defect.  The graft was then placed in the primary defect and oriented appropriately.
Ear Star Wedge Flap Text: The defect edges were debeveled with a #15 blade scalpel.  Given the location of the defect and the proximity to free margins (helical rim) an ear star wedge flap was deemed most appropriate.  Using a sterile surgical marker, the appropriate flap was drawn incorporating the defect and placing the expected incisions between the helical rim and antihelix where possible.  The area thus outlined was incised through and through with a #15 scalpel blade.
Suture Removal: 7 days
Non-Graft Cartilage Fenestration Text: The cartilage was fenestrated with a 2mm punch biopsy to help facilitate healing.
Initial Size Of Lesion: 0.4
Xenograft Text: The defect edges were debeveled with a #15 scalpel blade.  Given the location of the defect, shape of the defect and the proximity to free margins a xenograft was deemed most appropriate.  The graft was then trimmed to fit the size of the defect.  The graft was then placed in the primary defect and oriented appropriately.
Interpolation Flap Text: A decision was made to reconstruct the defect utilizing an interpolation axial flap and a staged reconstruction.  A telfa template was made of the defect.  This telfa template was then used to outline the interpolation flap.  The donor area for the pedicle flap was then injected with anesthesia.  The flap was excised through the skin and subcutaneous tissue down to the layer of the underlying musculature.  The interpolation flap was carefully excised within this deep plane to maintain its blood supply.  The edges of the donor site were undermined.   The donor site was closed in a primary fashion.  The pedicle was then rotated into position and sutured.  Once the tube was sutured into place, adequate blood supply was confirmed with blanching and refill.  The pedicle was then wrapped with xeroform gauze and dressed appropriately with a telfa and gauze bandage to ensure continued blood supply and protect the attached pedicle.
Advancement-Rotation Flap Text: The defect edges were debeveled with a #15 scalpel blade.  Given the location of the defect, shape of the defect and the proximity to free margins an advancement-rotation flap was deemed most appropriate.  Using a sterile surgical marker, an appropriate flap was drawn incorporating the defect and placing the expected incisions within the relaxed skin tension lines where possible. The area thus outlined was incised deep to adipose tissue with a #15 scalpel blade.  The skin margins were undermined to an appropriate distance in all directions utilizing iris scissors.
Mauc Instructions: By selecting yes to the question below the MAUC number will be added into the note.  This will be calculated automatically based on the diagnosis chosen, the size entered, the body zone selected (H,M,L) and the specific indications you chose. You will also have the option to override the Mohs AUC if you disagree with the automatically calculated number and this option is found in the Case Summary tab.
Area M Indication Text: Tumors in this location are included in Area M (cheek, forehead, scalp, neck, jawline and pretibial skin).  Mohs surgery is indicated for tumors in these anatomic locations.
Same Histology In Subsequent Stages Text: The pattern and morphology of the tumor is as described in the first stage.
Medical Necessity Statement: Based on my medical judgement, Mohs surgery is the most appropriate treatment for this cancer compared to other treatments.
Partial Purse String (Intermediate) Text: Given the location of the defect and the characteristics of the surrounding skin an intermediate purse string closure was deemed most appropriate.  Undermining was performed circumfirentially around the surgical defect.  A purse string suture was then placed and tightened. Wound tension only allowed a partial closure of the circular defect.
Dorsal Nasal Flap Text: The defect edges were debeveled with a #15 scalpel blade.  Given the location of the defect and the proximity to free margins a dorsal nasal flap,based upon the glabellar folds, was deemed most appropriate.  Using a sterile surgical marker, an appropriate dorsal nasal flap was drawn around the defect.    The area thus outlined was incised deep to adipose tissue with a #15 scalpel blade.  The skin margins were undermined to an appropriate distance in all directions utilizing iris scissors.
Primary Defect Width In Cm (Final Defect Size - Required For Flaps/Grafts): 1.3
Mohs Case Number: [id number]
Oculoplastic Surgeon (A): Gal
Graft Donor Site Epidermal Sutures (Optional): 5-0 Ethibond
Consent 3/Introductory Paragraph: I gave the patient a chance to ask questions they had about the procedure.  Following this I explained the Mohs procedure and consent was obtained. The risks, benefits and alternatives to therapy were discussed in detail. Specifically, the risks of infection, scarring, bleeding, prolonged wound healing, incomplete removal, allergy to anesthesia, nerve injury and recurrence were addressed. Prior to the procedure, the treatment site was clearly identified and confirmed by the patient. All components of Universal Protocol/PAUSE Rule completed.
Paramedian Forehead Flap Text: A decision was made to reconstruct the defect utilizing an interpolation axial flap and a staged reconstruction.  A telfa template was made of the defect.  This telfa template was then used to outline the paramedian forehead pedicle flap.  The donor area for the pedicle flap was then injected with anesthesia.  The flap was excised through the skin and subcutaneous tissue down to the layer of the underlying musculature.  The pedicle flap was carefully excised within this deep plane to maintain its blood supply.  The edges of the donor site were undermined.   The donor site was closed in a primary fashion.  The pedicle was then rotated into position and sutured.  Once the tube was sutured into place, adequate blood supply was confirmed with blanching and refill.  The pedicle was then wrapped with xeroform gauze and dressed appropriately with a telfa and gauze bandage to ensure continued blood supply and protect the attached pedicle.
Hatchet Flap Text: The defect edges were debeveled with a #15 scalpel blade.  Given the location of the defect, shape of the defect and the proximity to free margins a hatchet flap based from the glabella was deemed most appropriate.  Using a sterile surgical marker, an appropriate glabellar hatchet flap was drawn incorporating the defect and placing the expected incisions within the relaxed skin tension lines where possible.    The area thus outlined was incised deep to adipose tissue with a #15 scalpel blade.  The skin margins were undermined to an appropriate distance in all directions utilizing iris scissors.
Epidermal Closure: running cuticular
Complex Repair And Graft Additional Text (Will Appearing After The Standard Complex Repair Text): The complex repair was not sufficient to completely close the primary defect. The remaining additional defect was repaired with the graft mentioned below.
Double Island Pedicle Flap Text: The defect edges were debeveled with a #15 scalpel blade.  Given the location of the defect, shape of the defect and the proximity to free margins a double island pedicle advancement flap was deemed most appropriate.  Using a sterile surgical marker, an appropriate advancement flap was drawn incorporating the defect, outlining the appropriate donor tissue and placing the expected incisions within the relaxed skin tension lines where possible.    The area thus outlined was incised deep to adipose tissue with a #15 scalpel blade.  The skin margins were undermined to an appropriate distance in all directions around the primary defect and laterally outward around the island pedicle utilizing iris scissors.  There was minimal undermining beneath the pedicle flap.
Location Indication Override (Is Already Calculated Based On Selected Body Location): Area H
Referring Physician (Optional): Dr Murillo
Consent (Ear)/Introductory Paragraph: The rationale for Mohs was explained to the patient and consent was obtained. The risks, benefits and alternatives to therapy were discussed in detail. Specifically, the risks of ear deformity, infection, scarring, bleeding, prolonged wound healing, incomplete removal, allergy to anesthesia, nerve injury and recurrence were addressed. Prior to the procedure, the treatment site was clearly identified and confirmed by the patient. All components of Universal Protocol/PAUSE Rule completed.
Muscle Hinge Flap Text: The defect edges were debeveled with a #15 scalpel blade.  Given the size, depth and location of the defect and the proximity to free margins a muscle hinge flap was deemed most appropriate.  Using a sterile surgical marker, an appropriate hinge flap was drawn incorporating the defect. The area thus outlined was incised with a #15 scalpel blade.  The skin margins were undermined to an appropriate distance in all directions utilizing iris scissors.
Bcc Infiltrative Histology Text: There were numerous aggregates of basaloid cells demonstrating an infiltrative pattern.
Ftsg Text: The defect edges were debeveled with a #15 scalpel blade.  Given the location of the defect, shape of the defect and the proximity to free margins a full thickness skin graft was deemed most appropriate.  Using a sterile surgical marker, the primary defect shape was transferred to the donor site. The area thus outlined was incised deep to adipose tissue with a #15 scalpel blade.  The harvested graft was then trimmed of adipose tissue until only dermis and epidermis was left.  The skin margins of the secondary defect were undermined to an appropriate distance in all directions utilizing iris scissors.  The secondary defect was closed with interrupted buried subcutaneous sutures.  The skin edges were then re-apposed with running  sutures.  The skin graft was then placed in the primary defect and oriented appropriately.
Chonodrocutaneous Helical Advancement Flap Text: The defect edges were debeveled with a #15 scalpel blade.  Given the location of the defect and the proximity to free margins a chondrocutaneous helical advancement flap was deemed most appropriate.  Using a sterile surgical marker, the appropriate advancement flap was drawn incorporating the defect and placing the expected incisions within the relaxed skin tension lines where possible.    The area thus outlined was incised deep to adipose tissue with a #15 scalpel blade.  The skin margins were undermined to an appropriate distance in all directions utilizing iris scissors.
Unique Flap 1 Text: A decision was made to reconstruct the defect utilizing a myocutaneous Island pedicle Flap based on the levator labii superioris muscle.  A telfa template was made of the defect.  This telfa template was then used to outline the myocutaneous flap, based along the meilolabial fold.  The donor area for the pedicle flap was then injected with anesthesia.  The flap was excised through the skin and subcutaneous tissue down to the layer of the underlying musculature.  The myocutaneous flap was carefully excised within this deep plane to maintain its blood supply. Based on the muscle. The edges of the donor site were undermined.   The donor site was closed in a primary fashion to the point of transposition.  The pedicle was then transposed into position and sutured.  Once the flap was sutured into place, adequate blood supply was confirmed with blanching and refill.
Double O-Z Plasty Text: The defect edges were debeveled with a #15 scalpel blade.  Given the location of the defect, shape of the defect and the proximity to free margins a Double O-Z plasty (double transposition flap) was deemed most appropriate.  Using a sterile surgical marker, the appropriate transposition flaps were drawn incorporating the defect and placing the expected incisions within the relaxed skin tension lines where possible. The area thus outlined was incised deep to adipose tissue with a #15 scalpel blade.  The skin margins were undermined to an appropriate distance in all directions utilizing iris scissors.  Hemostasis was achieved with electrocautery.  The flaps were then transposed into place, one clockwise and the other counterclockwise, and anchored with interrupted buried subcutaneous sutures.
Deep Sutures: 5-0 Vicryl
Detail Level: Detailed
Epidermal Autograft Text: The defect edges were debeveled with a #15 scalpel blade.  Given the location of the defect, shape of the defect and the proximity to free margins an epidermal autograft was deemed most appropriate.  Using a sterile surgical marker, the primary defect shape was transferred to the donor site. The epidermal graft was then harvested.  The skin graft was then placed in the primary defect and oriented appropriately.
Bi-Rhombic Flap Text: The defect edges were debeveled with a #15 scalpel blade.  Given the location of the defect and the proximity to free margins a bi-rhombic flap was deemed most appropriate.  Using a sterile surgical marker, an appropriate rhombic flap was drawn incorporating the defect. The area thus outlined was incised deep to adipose tissue with a #15 scalpel blade.  The skin margins were undermined to an appropriate distance in all directions utilizing iris scissors.
Wound Care (No Sutures): Petrolatum
Hemostasis: Electrocautery
O-L Flap Text: The defect edges were debeveled with a #15 scalpel blade.  Given the location of the defect, shape of the defect and the proximity to free margins an O-L flap was deemed most appropriate.  Using a sterile surgical marker, an appropriate advancement flap was drawn incorporating the defect and placing the expected incisions within the relaxed skin tension lines where possible.    The area thus outlined was incised deep to adipose tissue with a #15 scalpel blade.  The skin margins were undermined to an appropriate distance in all directions utilizing iris scissors.
Manual Repair Warning Statement: We plan on removing the manually selected variable below in favor of our much easier automatic structured text blocks found in the previous tab. We decided to do this to help make the flow better and give you the full power of structured data. Manual selection is never going to be ideal in our platform and I would encourage you to avoid using manual selection from this point on, especially since I will be sunsetting this feature. It is important that you do one of two things with the customized text below. First, you can save all of the text in a word file so you can have it for future reference. Second, transfer the text to the appropriate area in the Library tab. Lastly, if there is a flap or graft type which we do not have you need to let us know right away so I can add it in before the variable is hidden. No need to panic, we plan to give you roughly 6 months to make the change.
W Plasty Text: The lesion was extirpated to the level of the fat with a #15 scalpel blade.  Given the location of the defect, shape of the defect and the proximity to free margins a W-plasty was deemed most appropriate for repair.  Using a sterile surgical marker, the appropriate transposition arms of the W-plasty were drawn incorporating the defect and placing the expected incisions within the relaxed skin tension lines where possible.    The area thus outlined was incised deep to adipose tissue with a #15 scalpel blade.  The skin margins were undermined to an appropriate distance in all directions utilizing iris scissors.  The opposing transposition arms were then transposed into place in opposite direction and anchored with interrupted buried subcutaneous sutures.
Banner Transposition Flap Text: The defect edges were debeveled with a #15 scalpel blade.  Given the location of the defect and the proximity to free margins a Banner transposition flap was deemed most appropriate.  Using a sterile surgical marker, an appropriate flap drawn around the defect. The area thus outlined was incised deep to adipose tissue with a #15 scalpel blade.  The skin margins were undermined to an appropriate distance in all directions utilizing iris scissors.
Bilobed Flap Text: The defect edges were debeveled with a #15 scalpel blade.  Given the location of the defect and the proximity to free margins a bilobe flap was deemed most appropriate.  Using a sterile surgical marker, an appropriate bilobe flap drawn around the defect.    The area thus outlined was incised deep to adipose tissue with a #15 scalpel blade.  The skin margins were undermined to an appropriate distance in all directions utilizing iris scissors.
Graft Cartilage Fenestration Text: The cartilage was fenestrated with a 2mm punch biopsy to help facilitate graft survival and healing.
Purse String (Simple) Text: Given the location of the defect and the characteristics of the surrounding skin a purse string closure was deemed most appropriate.  Undermining was performed circumfirentially around the surgical defect.  A purse string suture was then placed and tightened.
X Size Of Lesion In Cm (Optional): 0.3
No Residual Tumor Seen Histology Text: There were no malignant cells seen in the sections examined.
Alternatives Discussed Intro (Do Not Add Period): I discussed alternative treatments to Mohs surgery and specifically discussed the risks and benefits of
Melolabial Interpolation Flap Text: A decision was made to reconstruct the defect utilizing an interpolation axial flap and a staged reconstruction.  A telfa template was made of the defect.  This telfa template was then used to outline the melolabial interpolation flap.  The donor area for the pedicle flap was then injected with anesthesia.  The flap was excised through the skin and subcutaneous tissue down to the layer of the underlying musculature.  The pedicle flap was carefully excised within this deep plane to maintain its blood supply.  The edges of the donor site were undermined.   The donor site was closed in a primary fashion.  The pedicle was then rotated into position and sutured.  Once the tube was sutured into place, adequate blood supply was confirmed with blanching and refill.  The pedicle was then wrapped with xeroform gauze and dressed appropriately with a telfa and gauze bandage to ensure continued blood supply and protect the attached pedicle.
Area L Indication Text: Tumors in this location are included in Area L (trunk and extremities).  Mohs surgery is indicated for larger tumors, or tumors with aggressive histologic features, in these anatomic locations.
Mercedes Flap Text: The defect edges were debeveled with a #15 scalpel blade.  Given the location of the defect, shape of the defect and the proximity to free margins a Mercedes flap was deemed most appropriate.  Using a sterile surgical marker, an appropriate advancement flap was drawn incorporating the defect and placing the expected incisions within the relaxed skin tension lines where possible. The area thus outlined was incised deep to adipose tissue with a #15 scalpel blade.  The skin margins were undermined to an appropriate distance in all directions utilizing iris scissors.
Surgeon/Pathologist Verbiage (Will Incorporate Name Of Surgeon From Intro If Not Blank): operated in two distinct and integrated capacities as the surgeon and pathologist.
Localized Dermabrasion With Wire Brush Text: The patient was draped in routine manner.  Localized dermabrasion using 3 x 17 mm wire brush was performed in routine manner to papillary dermis. This spot dermabrasion is being performed to complete skin cancer reconstruction. It also will eliminate the other sun damaged precancerous cells that are known to be part of the regional effect of a lifetime's worth of sun exposure. This localized dermabrasion is therapeutic and should not be considered cosmetic in any regard.
Island Pedicle Flap Text: The defect edges were debeveled with a #15 scalpel blade.  Given the location of the defect, shape of the defect and the proximity to free margins an island pedicle advancement flap was deemed most appropriate.  Using a sterile surgical marker, an appropriate advancement flap was drawn incorporating the defect, outlining the appropriate donor tissue and placing the expected incisions within the relaxed skin tension lines where possible.    The area thus outlined was incised deep to adipose tissue with a #15 scalpel blade.  The skin margins were undermined to an appropriate distance in all directions around the primary defect and laterally outward around the island pedicle utilizing iris scissors.  There was minimal undermining beneath the pedicle flap.
Cheiloplasty (Less Than 50%) Text: A decision was made to reconstruct the defect with a  cheiloplasty.  The defect was undermined extensively.  Additional obicularis oris muscle was excised with a 15 blade scalpel.  The defect was converted into a full thickness wedge, of less than 50% of the vertical height of the lip, to facilite a better cosmetic result.  Small vessels were then tied off with 5-0 monocyrl. The obicularis oris, superficial fascia, adipose and dermis were then reapproximated.  After the deeper layers were approximated the epidermis was reapproximated with particular care given to realign the vermilion border.
Provider (A): Dr. Hall
Consent (Temporal Branch)/Introductory Paragraph: The rationale for Mohs was explained to the patient and consent was obtained. The risks, benefits and alternatives to therapy were discussed in detail. Specifically, the risks of damage to the temporal branch of the facial nerve, infection, scarring, bleeding, prolonged wound healing, incomplete removal, allergy to anesthesia, and recurrence were addressed. Prior to the procedure, the treatment site was clearly identified and confirmed by the patient. All components of Universal Protocol/PAUSE Rule completed.
Subsequent Stages Histo Method Verbiage: Using a similar technique to that described above, a thin layer of tissue was removed from all areas where tumor was visible on the previous stage.  The tissue was again oriented, mapped, dyed, and processed as above.
Rotation Flap Text: The defect edges were debeveled with a #15 scalpel blade.  Given the location of the defect, shape of the defect and the proximity to free margins a rotation flap was deemed most appropriate.  Using a sterile surgical marker, an appropriate rotation flap was drawn incorporating the defect and placing the expected incisions within the relaxed skin tension lines where possible.    The area thus outlined was incised deep to adipose tissue with a #15 scalpel blade.  The skin margins were undermined to an appropriate distance in all directions utilizing iris scissors.
Where Do You Want The Question To Include Opioid Counseling Located?: Case Summary Tab
Island Pedicle Flap-Requiring Vessel Identification Text: The defect edges were debeveled with a #15 scalpel blade.  Given the location of the defect, shape of the defect and the proximity to free margins an island pedicle advancement flap was deemed most appropriate.  Using a sterile surgical marker, an appropriate advancement flap was drawn, based on the axial vessel mentioned above, incorporating the defect, outlining the appropriate donor tissue and placing the expected incisions within the relaxed skin tension lines where possible.    The area thus outlined was incised deep to adipose tissue with a #15 scalpel blade.  The skin margins were undermined to an appropriate distance in all directions around the primary defect and laterally outward around the island pedicle utilizing iris scissors.  There was minimal undermining beneath the pedicle flap.
Unique Flap 1 Name: Myocutaneous Island pedicle Flap
Unique Flap 2 Name: Peng Flap
Keystone Flap Text: The defect edges were debeveled with a #15 scalpel blade.  Given the location of the defect, shape of the defect a keystone flap was deemed most appropriate.  Using a sterile surgical marker, an appropriate keystone flap was drawn incorporating the defect, outlining the appropriate donor tissue and placing the expected incisions within the relaxed skin tension lines where possible. The area thus outlined was incised deep to adipose tissue with a #15 scalpel blade.  The skin margins were undermined to an appropriate distance in all directions around the primary defect and laterally outward around the flap utilizing iris scissors.
Post-Care Instructions: I reviewed with the patient in detail post-care instructions. Patient is not to engage in any heavy lifting, exercise, or swimming for the next 14 days. Should the patient develop any fevers, chills, bleeding, severe pain patient will contact the office immediately.
Advancement Flap (Single) Text: The defect edges were debeveled with a #15 scalpel blade.  Given the location of the defect and the proximity to free margins a single advancement flap was deemed most appropriate.  Using a sterile surgical marker, an appropriate advancement flap was drawn incorporating the defect and placing the expected incisions within the relaxed skin tension lines where possible.    The area thus outlined was incised deep to adipose tissue with a #15 scalpel blade.  The skin margins were undermined to an appropriate distance in all directions utilizing iris scissors.
Consent Type: Consent 1 (Standard)
Consent (Nose)/Introductory Paragraph: The rationale for Mohs was explained to the patient and consent was obtained. The risks, benefits and alternatives to therapy were discussed in detail. Specifically, the risks of nasal deformity, changes in the flow of air through the nose, infection, scarring, bleeding, prolonged wound healing, incomplete removal, allergy to anesthesia, nerve injury and recurrence were addressed. Prior to the procedure, the treatment site was clearly identified and confirmed by the patient. All components of Universal Protocol/PAUSE Rule completed.
Split-Thickness Skin Graft Text: The defect edges were debeveled with a #15 scalpel blade.  Given the location of the defect, shape of the defect and the proximity to free margins a split thickness skin graft was deemed most appropriate.  Using a sterile surgical marker, the primary defect shape was transferred to the donor site. The split thickness graft was then harvested.  The skin graft was then placed in the primary defect and oriented appropriately.
Melolabial Transposition Flap Text: The defect edges were debeveled with a #15 scalpel blade.  Given the location of the defect and the proximity to free margins a melolabial flap was deemed most appropriate.  Using a sterile surgical marker, an appropriate melolabial transposition flap was drawn incorporating the defect.    The area thus outlined was incised deep to adipose tissue with a #15 scalpel blade.  The skin margins were undermined to an appropriate distance in all directions utilizing iris scissors.
Unique Flap 2 Text: A decision was made to reconstruct the defect utilizing a Peng Flap (Bilateral Advancement Rotation Flap). Given the location of the defect and the proximity to free margins, this flap was deemed most appropriate.  Using a sterile surgical marker, the appropriate rotation flaps were drawn incorporating the defect and placing the expected incisions within the relaxed skin tension lines where possible.    The area thus outlined was incised deep to adipose tissue with a #15 scalpel blade.  The skin margins were undermined to an appropriate distance in all directions utilizing iris scissors.
S Plasty Text: Given the location and shape of the defect, and the orientation of relaxed skin tension lines, an S-plasty was deemed most appropriate for repair.  Using a sterile surgical marker, the appropriate outline of the S-plasty was drawn, incorporating the defect and placing the expected incisions within the relaxed skin tension lines where possible.  The area thus outlined was incised deep to adipose tissue with a #15 scalpel blade.  The skin margins were undermined to an appropriate distance in all directions utilizing iris scissors. The skin flaps were advanced over the defect.  The opposing margins were then approximated with interrupted buried subcutaneous sutures.
Crescentic Advancement Flap Text: The defect edges were debeveled with a #15 scalpel blade.  Given the location of the defect and the proximity to free margins a crescentic advancement flap was deemed most appropriate.  Using a sterile surgical marker, the appropriate advancement flap was drawn incorporating the defect and placing the expected incisions within the relaxed skin tension lines where possible.    The area thus outlined was incised deep to adipose tissue with a #15 scalpel blade.  The skin margins were undermined to an appropriate distance in all directions utilizing iris scissors.
Suturegard Intro: Intraoperative tissue expansion was performed, utilizing the SUTUREGARD device, in order to reduce wound tension.
Anesthesia Type: 0.5% lidocaine with 1:200,000 epinephrine and a 1:10 solution of 8.4% sodium bicarbonate and 408mcg clindamycin/ml
Dermal Autograft Text: The defect edges were debeveled with a #15 scalpel blade.  Given the location of the defect, shape of the defect and the proximity to free margins a dermal autograft was deemed most appropriate.  Using a sterile surgical marker, the primary defect shape was transferred to the donor site. The area thus outlined was incised deep to adipose tissue with a #15 scalpel blade.  The harvested graft was then trimmed of adipose and epidermal tissue until only dermis was left.  The skin graft was then placed in the primary defect and oriented appropriately.
Information: Selecting Yes will display possible errors in your note based on the variables you have selected. This validation is only offered as a suggestion for you. PLEASE NOTE THAT THE VALIDATION TEXT WILL BE REMOVED WHEN YOU FINALIZE YOUR NOTE. IF YOU WANT TO FAX A PRELIMINARY NOTE YOU WILL NEED TO TOGGLE THIS TO 'NO' IF YOU DO NOT WANT IT IN YOUR FAXED NOTE.
Helical Rim Advancement Flap Text: The defect edges were debeveled with a #15 blade scalpel.  Given the location of the defect and the proximity to free margins (helical rim) a double helical rim advancement flap was deemed most appropriate.  Using a sterile surgical marker, the appropriate advancement flaps were drawn incorporating the defect and placing the expected incisions between the helical rim and antihelix where possible.  The area thus outlined was incised through and through with a #15 scalpel blade.  With a skin hook and iris scissors, the flaps were gently and sharply undermined and freed up.
Retention Suture Bite Size: 3 mm
Z Plasty Text: The lesion was extirpated to the level of the fat with a #15 scalpel blade.  Given the location of the defect, shape of the defect and the proximity to free margins a Z-plasty was deemed most appropriate for repair.  Using a sterile surgical marker, the appropriate transposition arms of the Z-plasty were drawn incorporating the defect and placing the expected incisions within the relaxed skin tension lines where possible.    The area thus outlined was incised deep to adipose tissue with a #15 scalpel blade.  The skin margins were undermined to an appropriate distance in all directions utilizing iris scissors.  The opposing transposition arms were then transposed into place in opposite direction and anchored with interrupted buried subcutaneous sutures.
O-Z Flap Text: The defect edges were debeveled with a #15 scalpel blade.  Given the location of the defect, shape of the defect and the proximity to free margins an O-Z flap was deemed most appropriate.  Using a sterile surgical marker, an appropriate transposition flap was drawn incorporating the defect and placing the expected incisions within the relaxed skin tension lines where possible. The area thus outlined was incised deep to adipose tissue with a #15 scalpel blade.  The skin margins were undermined to an appropriate distance in all directions utilizing iris scissors.

## 2020-01-27 ENCOUNTER — OFFICE VISIT (OUTPATIENT)
Dept: NEUROLOGY | Facility: MEDICAL CENTER | Age: 65
End: 2020-01-27
Payer: COMMERCIAL

## 2020-01-27 VITALS
HEART RATE: 76 BPM | OXYGEN SATURATION: 96 % | SYSTOLIC BLOOD PRESSURE: 96 MMHG | HEIGHT: 67 IN | DIASTOLIC BLOOD PRESSURE: 76 MMHG | WEIGHT: 170 LBS | BODY MASS INDEX: 26.68 KG/M2 | TEMPERATURE: 98 F

## 2020-01-27 DIAGNOSIS — G35 MS (MULTIPLE SCLEROSIS) (HCC): ICD-10-CM

## 2020-01-27 DIAGNOSIS — E06.3 HASHIMOTO'S DISEASE: ICD-10-CM

## 2020-01-27 PROCEDURE — 99214 OFFICE O/P EST MOD 30 MIN: CPT | Performed by: PSYCHIATRY & NEUROLOGY

## 2020-01-27 NOTE — PROGRESS NOTES
"CHIEF COMPLAINT  Chief Complaint   Patient presents with   • Follow-Up     MS       HPI  Bharti Zabala is a 62 y.o. female who presents to hospitals for her multiple sclerosis treatment.  MS (multiple sclerosis)  Pt has fatigue and drowsiness. Pt feels more forgetful. Pt on copaxone and she has questions about generic drug. Pt states that she has been having a change in packaging. Pt states that she has is on  also and she is trying to figure out what is best for her insurance. Pt states that she has a trip to the [facility] and she is going to [place]. Pt has clinically been doing well except for memory problems, she notes she cannot find words as easily. Short term memory can be affected. Pt states that she is taking her vitamin D.    Hashimoto's disease  Pt has been seeing doctor abbot and she has some labs pending.    REVIEW OF SYSTEMS  Pertinent Positives: Fatigue, occasional headaches, cognitive decline   All other systems are negative.     PAST MEDICAL HISTORY  Past Medical History:   Diagnosis Date   • Anesthesia     \"During my  all my blood went to my feet, during spinal\"   • Bowel habit changes     constipation   • Diabetes (HCC) 2017    Diet controlled   • High cholesterol    • MS (multiple sclerosis) ([facility])    • Pain 2017    left knee   • Urinary bladder disorder     \"Hx of ongoing bladder infections, none at the moment \"   • Urinary incontinence 2017    Pt uses straight cath bid, \"can't totally empty bladder due to MS\"       SOCIAL HISTORY  Social History     Socioeconomic History   • Marital status:      Spouse name: Not on file   • Number of children: Not on file   • Years of education: Not on file   • Highest education level: Not on file   Occupational History   • Not on file   Social Needs   • Financial resource strain: Not on file   • Food insecurity:     Worry: Not on file     Inability: Not on file   • Transportation needs:     Medical: Not on file     " Non-medical: Not on file   Tobacco Use   • Smoking status: Never Smoker   • Smokeless tobacco: Never Used   Substance and Sexual Activity   • Alcohol use: No   • Drug use: No   • Sexual activity: Not on file   Lifestyle   • Physical activity:     Days per week: Not on file     Minutes per session: Not on file   • Stress: Not on file   Relationships   • Social connections:     Talks on phone: Not on file     Gets together: Not on file     Attends Sikhism service: Not on file     Active member of club or organization: Not on file     Attends meetings of clubs or organizations: Not on file     Relationship status: Not on file   • Intimate partner violence:     Fear of current or ex partner: Not on file     Emotionally abused: Not on file     Physically abused: Not on file     Forced sexual activity: Not on file   Other Topics Concern   • Not on file   Social History Narrative   • Not on file       SURGICAL HISTORY  Past Surgical History:   Procedure Laterality Date   • KNEE UNICOMPARTMENTAL Left 5/23/2017    Procedure: KNEE UNICOMPARTMENTAL - MEDIAL [place];  Surgeon: Tim Horn M.D.;  Location: SURGERY [facility];  Service:    • PB CHOLECYSTOENTEROSTOMY  1992   • PRIMARY C SECTION  1991       CURRENT MEDICATIONS  Current Outpatient Medications   Medication Sig Dispense Refill   • COPAXONE 40 MG/ML Solution Prefilled Syringe Inject 40 mg as instructed 3 times a week. 12 mL 11   • Cranberry 450 MG Cap Take  by mouth.     • methenamine hip (HIPPREX) 1 GM Tab Take 1 g by mouth 2 Times a Day.     • oxybutynin SR (DITROPAN-XL) 5 MG TB24 Take 5 mg by mouth every morning.     • atorvastatin (LIPITOR) 20 MG TABS Take 20 mg by mouth every evening.     • predniSONE (DELTASONE) 10 MG TABS Take 10 mg by mouth every day. Take with food      • vitamin D (CHOLECALCIFEROL) 1000 UNIT TABS Take 5,000 Units by mouth every bedtime.       No current facility-administered medications for this visit.        ALLERGIES  Allergies  "  Allergen Reactions   • Sulfa Drugs Rash             PHYSICAL EXAM  VITAL SIGNS: BP (!) 96/76 (BP Location: Left arm, Patient Position: Sitting, BP Cuff Size: Adult)   Pulse 76   Temp 36.7 °C (98 °F) (Temporal)   Ht 1.702 m (5' 7\")   Wt 77.1 kg (170 lb)   SpO2 96%   BMI 26.63 kg/m²   Constitutional: Well developed, Well nourished, No acute distress, Non-toxic appearance.   HENT: Normocephalic atraumatic  Eyes: Fundi disc pale  Neck: Normal range of motion, No tenderness, Supple, No stridor.   Cardiovascular: Normal heart rate, Normal rhythm, No murmurs, No rubs, No gallops.   Thorax & Lungs: Normal breath sounds, No respiratory distress, No wheezing, No chest tenderness.   Abdomen: Bowel sounds normal, Soft, No tenderness, No masses, No pulsatile masses.   Skin: Warm, Dry, No erythema, No rash.   Back: No tenderness, No CVA tenderness.   Extremities: Intact distal pulses, No edema, No tenderness, No cyanosis, No clubbing.   Neurologic: Gait and station are normal say for a little bit of limping due   to her collateral ligament injury.  Romberg is negative.  Strength in the   upper and lower extremities is normal.  Plantar responses are equivocal.  She   can do finger-to-nose, heel-to-shin well.  Fine finger movements are done   well.  She has a little sustained nystagmus on lateral gaze.  Pupils equal,   round and reactive to light.  Fundi appeared to be in normal.  Range of motion   of the neck is normal.  No facial weakness or facial sensory deficit.  Pupils   are reactive to light and equal.  Psychiatric: Affect normal, Judgment normal, Mood normal.   Lab: Reviewed with patient in detail and as noted in results.    EEG  No abnormality    RADIOLOGY/PROCEDURES  MRI reviewed with the patient in the PACS system       Narrative      Pre-and postcontrast examination of the brain  3/18/2014 8:25 AM    HISTORY/REASON FOR EXAM:  Multiple sclerosis.      TECHNIQUE/EXAM DESCRIPTION:   MRI of the brain without and " with contrast.    T1 sagittal, T2 fast spin-echo axial, T1 coronal, FLAIR coronal, Diffusion weighted and Apparent Diffusion Coefficient (ADC map) axial images were obtained of the whole brain. T1 post-contrast axial and T1 post-contrast coronal images were obtained..    The study was performed on a [employer] Signa 1.5 Charis MRI scanner.    20 mL gadolinium contrast was administered intravenously.    COMPARISON:  6/11/00    FINDINGS:     There are multiple T2 hyperintense lesions in the subcortical and periventricular white matter, brainstem and middle cerebellar peduncles. None of the lesions demonstrates contrast enhancement. Most of the lesions are hypointense on T1-weighted sequences. There are no new lesions.    There is no acute infarct. There is no acute or chronic parenchymal hemorrhage. The ventricles, cortical sulci and the basal cisterns appear prominent consistent with mild cerebral and cerebellar atrophy.    The visualized flow voids of the cerebral vasculature appear normal.  There is no large lesion identified in the expected course of the intracranial portions of the cranial nerves.    The skull bones appear normal. The paranasal sinuses are clear. The extracranial soft tissue including orbits appear grossly normal.   Impression       1.  Multifocal T2 hyperintense lesions in the subcortical and periventricular white matter, brainstem and the bilateral middle cerebellar peduncles consistent with chronic plaques of multiple sclerosis. None of the lesions demonstrates contrast enhancement. Most of the lesions are hypointense on T1-weighted sequences. There are no new lesions.  2.  Mild cerebral atrophy.             ASSESSMENT AND PLAN  1. MS (multiple sclerosis) ([facility])  Plan to continue Copaxone at the current dose. We discussed cognitive concerns in addition to gait stability. We discussed healthy diet and gentle regular exercise program. Vitamin supplementation with vitamin D and disease prevention was  covered with the patient.Pt will continue with exercise and diet. [facility].    Follow-up in 6 months     I spent 35 minutes with this patient face to face, over fifty percent was spent counseling patient on their condition, best management practices, reviewing test results and risks and benefits of treatment.

## 2020-01-27 NOTE — ASSESSMENT & PLAN NOTE
Pt has fatigue and drowsiness. Pt feels more forgetful. Pt on copaxone and she has questions about generic drug. Pt states that she has been having a change in packaging. Pt states that she has is on  also and she is trying to figure out what is best for her insurance. Pt states that she has a trip to the [facility] and she is going to [place]. Pt has clinically been doing well except for memory problems, she notes she cannot find words as easily. Short term memory can be affected. Pt states that she is taking her vitamin D.

## 2020-03-24 ENCOUNTER — TELEPHONE (OUTPATIENT)
Dept: NEUROLOGY | Facility: MEDICAL CENTER | Age: 65
End: 2020-03-24

## 2020-03-24 NOTE — TELEPHONE ENCOUNTER
Pt called states she is taking Glatiramer since January instead of brand name Copaxone.  Pt states she did discuss this with dr Bloch at her appt in January. She doesn't have any issues with generic, she states will continue taking Glatiramer if any issues will let us know, pt verbally understood

## 2020-05-12 ENCOUNTER — OFFICE VISIT (OUTPATIENT)
Dept: NEUROLOGY | Facility: MEDICAL CENTER | Age: 65
End: 2020-05-12
Payer: MEDICARE

## 2020-05-12 VITALS
TEMPERATURE: 97.7 F | BODY MASS INDEX: 24.22 KG/M2 | OXYGEN SATURATION: 97 % | SYSTOLIC BLOOD PRESSURE: 116 MMHG | DIASTOLIC BLOOD PRESSURE: 72 MMHG | HEART RATE: 89 BPM | WEIGHT: 154.32 LBS | HEIGHT: 67 IN

## 2020-05-12 DIAGNOSIS — G35 MULTIPLE SCLEROSIS (HCC): ICD-10-CM

## 2020-05-12 PROCEDURE — 99214 OFFICE O/P EST MOD 30 MIN: CPT | Performed by: PSYCHIATRY & NEUROLOGY

## 2020-05-12 ASSESSMENT — PATIENT HEALTH QUESTIONNAIRE - PHQ9: CLINICAL INTERPRETATION OF PHQ2 SCORE: 0

## 2020-05-12 ASSESSMENT — FIBROSIS 4 INDEX: FIB4 SCORE: 1.19

## 2020-05-12 NOTE — ASSESSMENT & PLAN NOTE
Pt went on a cruise in early Feb and she was fine. Pt recently changed to medicare. Pt states that she was changed to Glatopa and it freaked her out. Pt states that she is doing fine with the change. She is injecting three times a week. Pt states that she can get a 3 month supply and she has a lower copay so she is happy. Pt is walking more recently and planting her workman from seeds in pots and she is walker her dog. Pt is in quarantine and doing home exercises and stretching and trying to strengthen her abs. Pt tries to touch her toes. Pt states she is looking forward to wandering and traveling again. Pt states that she gets some pain in her lower back. Pt now can walk more than .5 hour now. Pt has some urinary urgency and she is on oxybutynin and she occasionally needs to self cath several times a day.

## 2020-05-12 NOTE — PROGRESS NOTES
"CHIEF COMPLAINT  Chief Complaint   Patient presents with   • Follow-Up     MS       HPI  Bharti Zabala is a 62 y.o. female who presents to [facility] for her multiple sclerosis treatment.  MS (multiple sclerosis) (HCC)  Pt went on a cruise in early Feb and she was fine. Pt recently changed to medicare. Pt states that she was changed to Glatopa and it freaked her out. Pt states that she is doing fine with the change. She is injecting three times a week. Pt states that she can get a 3 month supply and she has a lower copay so she is happy. Pt is walking more recently and planting her workman from seeds in pots and she is walker her dog. Pt is in quarantine and doing home exercises and stretching and trying to strengthen her abs. Pt tries to touch her toes. Pt states she is looking forward to wandering and traveling again. Pt states that she gets some pain in her lower back. Pt now can walk more than .5 hour now. Pt has some urinary urgency and she is on oxybutynin and she occasionally needs to self cath several times a day.    REVIEW OF SYSTEMS  Pertinent Positives: Fatigue, occasional headaches, cognitive decline   All other systems are negative.     PAST MEDICAL HISTORY  Past Medical History:   Diagnosis Date   • Anesthesia     \"During my  all my blood went to my feet, during spinal\"   • Bowel habit changes     constipation   • Diabetes (HCC) 2017    Diet controlled   • High cholesterol    • MS (multiple sclerosis) (HCC)    • Pain 2017    left knee   • Urinary bladder disorder     \"Hx of ongoing bladder infections, none at the moment \"   • Urinary incontinence 2017    Pt uses straight cath bid, \"can't totally empty bladder due to MS\"       SOCIAL HISTORY  Social History     Socioeconomic History   • Marital status:      Spouse name: Not on file   • Number of children: Not on file   • Years of education: Not on file   • Highest education level: Not on file   Occupational History   • Not " on file   Social Needs   • Financial resource strain: Not on file   • Food insecurity     Worry: Not on file     Inability: Not on file   • Transportation needs     Medical: Not on file     Non-medical: Not on file   Tobacco Use   • Smoking status: Never Smoker   • Smokeless tobacco: Never Used   Substance and Sexual Activity   • Alcohol use: No   • Drug use: No   • Sexual activity: Not on file   Lifestyle   • Physical activity     Days per week: Not on file     Minutes per session: Not on file   • Stress: Not on file   Relationships   • Social connections     Talks on phone: Not on file     Gets together: Not on file     Attends Anglican service: Not on file     Active member of club or organization: Not on file     Attends meetings of clubs or organizations: Not on file     Relationship status: Not on file   • Intimate partner violence     Fear of current or ex partner: Not on file     Emotionally abused: Not on file     Physically abused: Not on file     Forced sexual activity: Not on file   Other Topics Concern   • Not on file   Social History Narrative   • Not on file       SURGICAL HISTORY  Past Surgical History:   Procedure Laterality Date   • KNEE UNICOMPARTMENTAL Left 5/23/2017    Procedure: KNEE UNICOMPARTMENTAL - MEDIAL [place];  Surgeon: Tim Horn M.D.;  Location: SURGERY [facility];  Service:    • PB CHOLECYSTOENTEROSTOMY  1992   • PRIMARY C SECTION  1991       CURRENT MEDICATIONS  Current Outpatient Medications   Medication Sig Dispense Refill   • Cranberry 450 MG Cap Take  by mouth.     • methenamine hip (HIPPREX) 1 GM Tab Take 1 g by mouth 2 Times a Day.     • oxybutynin SR (DITROPAN-XL) 5 MG TB24 Take 5 mg by mouth every morning.     • atorvastatin (LIPITOR) 20 MG TABS Take 20 mg by mouth every evening.     • predniSONE (DELTASONE) 10 MG TABS Take 10 mg by mouth every day. Take with food      • vitamin D (CHOLECALCIFEROL) 1000 UNIT TABS Take 5,000 Units by mouth every bedtime.       No  "current facility-administered medications for this visit.        ALLERGIES  Allergies   Allergen Reactions   • Sulfa Drugs Rash             PHYSICAL EXAM  VITAL SIGNS: /72 (BP Location: Left arm, Patient Position: Sitting, BP Cuff Size: Adult)   Pulse 89   Temp 36.5 °C (97.7 °F) (Temporal)   Ht 1.702 m (5' 7\")   Wt 70 kg (154 lb 5.2 oz)   SpO2 97%   BMI 24.17 kg/m²   Constitutional: Well developed, Well nourished, No acute distress, Non-toxic appearance.   HENT: Normocephalic atraumatic  Eyes: Fundi disc pale  Neck: Normal range of motion, No tenderness, Supple, No stridor.   Cardiovascular: Normal heart rate, Normal rhythm, No murmurs, No rubs, No gallops.   Thorax & Lungs: Normal breath sounds, No respiratory distress, No wheezing, No chest tenderness.   Abdomen: Bowel sounds normal, Soft, No tenderness, No masses, No pulsatile masses.   Skin: Warm, Dry, No erythema, No rash.   Back: No tenderness, No CVA tenderness.   Extremities: Intact distal pulses, No edema, No tenderness, No cyanosis, No clubbing.   Neurologic: Gait and station are normal say for a little bit of limping due   to her collateral ligament injury.  Romberg is negative.  Strength in the   upper and lower extremities is normal.  Plantar responses are equivocal.  She   can do finger-to-nose, heel-to-shin well.  Fine finger movements are done   well.  She has a little sustained nystagmus on lateral gaze.  Pupils equal,   round and reactive to light.  Fundi appeared to be in normal.  Range of motion   of the neck is normal.  No facial weakness or facial sensory deficit.  Pupils   are reactive to light and equal.  Psychiatric: Affect normal, Judgment normal, Mood normal.   Lab: Reviewed with patient in detail and as noted in results.    EEG  No abnormality    RADIOLOGY/PROCEDURES  MRI reviewed with the patient in the PACS system       Narrative      Pre-and postcontrast examination of the brain  3/18/2014 8:25 AM    HISTORY/REASON FOR " EXAM:  Multiple sclerosis.      TECHNIQUE/EXAM DESCRIPTION:   MRI of the brain without and with contrast.    T1 sagittal, T2 fast spin-echo axial, T1 coronal, FLAIR coronal, Diffusion weighted and Apparent Diffusion Coefficient (ADC map) axial images were obtained of the whole brain. T1 post-contrast axial and T1 post-contrast coronal images were obtained..    The study was performed on a [employer] Signa 1.5 Charis MRI scanner.    20 mL gadolinium contrast was administered intravenously.    COMPARISON:  6/11/00    FINDINGS:     There are multiple T2 hyperintense lesions in the subcortical and periventricular white matter, brainstem and middle cerebellar peduncles. None of the lesions demonstrates contrast enhancement. Most of the lesions are hypointense on T1-weighted sequences. There are no new lesions.    There is no acute infarct. There is no acute or chronic parenchymal hemorrhage. The ventricles, cortical sulci and the basal cisterns appear prominent consistent with mild cerebral and cerebellar atrophy.    The visualized flow voids of the cerebral vasculature appear normal.  There is no large lesion identified in the expected course of the intracranial portions of the cranial nerves.    The skull bones appear normal. The paranasal sinuses are clear. The extracranial soft tissue including orbits appear grossly normal.   Impression       1.  Multifocal T2 hyperintense lesions in the subcortical and periventricular white matter, brainstem and the bilateral middle cerebellar peduncles consistent with chronic plaques of multiple sclerosis. None of the lesions demonstrates contrast enhancement. Most of the lesions are hypointense on T1-weighted sequences. There are no new lesions.  2.  Mild cerebral atrophy.             ASSESSMENT AND PLAN  1. MS (multiple sclerosis) (CMS-HCC)  Plan to continue Glatopa at the current dose. We discussed cognitive concerns in addition to gait stability. We discussed healthy diet and gentle  regular exercise program. Vitamin supplementation with vitamin D and disease prevention was covered with the patient.Pt will continue with exercise and diet. She has lost 60 pounds over the last 2 years and she has felt better.    Follow-up in 6 months     I spent 30 minutes with this patient face to face, over fifty percent was spent counseling patient on their condition, best management practices, reviewing test results and risks and benefits of treatment.

## 2020-07-08 ENCOUNTER — OUTPATIENT INFUSION SERVICES (OUTPATIENT)
Dept: ONCOLOGY | Facility: MEDICAL CENTER | Age: 65
End: 2020-07-08
Attending: FAMILY MEDICINE
Payer: MEDICARE

## 2020-07-08 VITALS
RESPIRATION RATE: 18 BRPM | TEMPERATURE: 97 F | HEIGHT: 66 IN | BODY MASS INDEX: 24.52 KG/M2 | WEIGHT: 152.56 LBS | SYSTOLIC BLOOD PRESSURE: 104 MMHG | OXYGEN SATURATION: 94 % | HEART RATE: 82 BPM | DIASTOLIC BLOOD PRESSURE: 64 MMHG

## 2020-07-08 LAB
CA-I BLD ISE-SCNC: 1.12 MMOL/L (ref 1.1–1.3)
CREAT BLD-MCNC: 0.7 MG/DL (ref 0.5–1.4)

## 2020-07-08 PROCEDURE — 36415 COLL VENOUS BLD VENIPUNCTURE: CPT

## 2020-07-08 PROCEDURE — 82565 ASSAY OF CREATININE: CPT

## 2020-07-08 PROCEDURE — 96372 THER/PROPH/DIAG INJ SC/IM: CPT

## 2020-07-08 PROCEDURE — 82330 ASSAY OF CALCIUM: CPT

## 2020-07-08 PROCEDURE — 700111 HCHG RX REV CODE 636 W/ 250 OVERRIDE (IP): Mod: JG | Performed by: FAMILY MEDICINE

## 2020-07-08 RX ADMIN — DENOSUMAB 60 MG: 60 INJECTION SUBCUTANEOUS at 08:25

## 2020-07-08 ASSESSMENT — FIBROSIS 4 INDEX: FIB4 SCORE: 1.19

## 2020-07-08 NOTE — PROGRESS NOTES
Pt arrived to [facility], ambulatory, for Prolia injection. Pt voices no complaints. Pt denies any recent or upcoming dental surgeries. Labs drawn and reviewed, pt within parameters to treat today. Prolia injected into the L-back arm with no s/sx of adverse reaction. Pt left [facility] with no s/sx of distress. Follow up appointment confirmed.

## 2020-11-23 ENCOUNTER — OFFICE VISIT (OUTPATIENT)
Dept: NEUROLOGY | Facility: MEDICAL CENTER | Age: 65
End: 2020-11-23
Payer: MEDICARE

## 2020-11-23 VITALS
TEMPERATURE: 98.5 F | SYSTOLIC BLOOD PRESSURE: 110 MMHG | BODY MASS INDEX: 24.8 KG/M2 | HEIGHT: 66 IN | HEART RATE: 80 BPM | WEIGHT: 154.32 LBS | OXYGEN SATURATION: 98 % | DIASTOLIC BLOOD PRESSURE: 64 MMHG

## 2020-11-23 DIAGNOSIS — G62.9 NEUROPATHY: ICD-10-CM

## 2020-11-23 DIAGNOSIS — G35 MULTIPLE SCLEROSIS (HCC): ICD-10-CM

## 2020-11-23 PROCEDURE — 99214 OFFICE O/P EST MOD 30 MIN: CPT | Performed by: PSYCHIATRY & NEUROLOGY

## 2020-11-23 RX ORDER — DALFAMPRIDINE 10 MG/1
10 TABLET, FILM COATED, EXTENDED RELEASE ORAL 2 TIMES DAILY
Qty: 180 TAB | Refills: 3 | Status: SHIPPED | OUTPATIENT
Start: 2020-11-23 | End: 2021-08-23

## 2020-11-23 RX ORDER — LISINOPRIL 2.5 MG/1
2.5 TABLET ORAL DAILY
COMMUNITY
End: 2021-08-23

## 2020-11-23 RX ORDER — GLATIRAMER 40 MG/ML
INJECTION, SOLUTION SUBCUTANEOUS
COMMUNITY
End: 2021-11-04

## 2020-11-23 NOTE — ASSESSMENT & PLAN NOTE
Pt states she has been stable with the Glatopa. She has been sometimes light headed with changes in her prednisone from her underlying CAH. Pt was on Avonex and she felt like it caused too many flu like side effects.

## 2020-11-23 NOTE — ASSESSMENT & PLAN NOTE
Pt with numbness and tingling in her feet and she only feels like she has had these symptoms for a longtime. Right leg weaker and she feels like she gets some drop foot.

## 2020-12-01 LAB
ALBUMIN SERPL-MCNC: 4.4 G/DL (ref 3.8–4.8)
ALBUMIN/GLOB SERPL: 1.8 {RATIO} (ref 1.2–2.2)
ALP SERPL-CCNC: 55 IU/L (ref 39–117)
ALT SERPL-CCNC: 32 IU/L (ref 0–32)
AST SERPL-CCNC: 21 IU/L (ref 0–40)
BASOPHILS # BLD AUTO: 0 X10E3/UL (ref 0–0.2)
BASOPHILS NFR BLD AUTO: 0 %
BILIRUB SERPL-MCNC: 0.6 MG/DL (ref 0–1.2)
BUN SERPL-MCNC: 15 MG/DL (ref 8–27)
BUN/CREAT SERPL: 22 (ref 12–28)
CALCIUM SERPL-MCNC: 9.5 MG/DL (ref 8.7–10.3)
CHLORIDE SERPL-SCNC: 101 MMOL/L (ref 96–106)
CO2 SERPL-SCNC: 27 MMOL/L (ref 20–29)
CREAT SERPL-MCNC: 0.68 MG/DL (ref 0.57–1)
EOSINOPHIL # BLD AUTO: 0.1 X10E3/UL (ref 0–0.4)
EOSINOPHIL NFR BLD AUTO: 2 %
ERYTHROCYTE [DISTWIDTH] IN BLOOD BY AUTOMATED COUNT: 12.4 % (ref 11.7–15.4)
GLOBULIN SER CALC-MCNC: 2.4 G/DL (ref 1.5–4.5)
GLUCOSE SERPL-MCNC: 95 MG/DL (ref 65–99)
HCT VFR BLD AUTO: 43.3 % (ref 34–46.6)
HGB BLD-MCNC: 14.3 G/DL (ref 11.1–15.9)
IMM GRANULOCYTES # BLD AUTO: 0 X10E3/UL (ref 0–0.1)
IMM GRANULOCYTES NFR BLD AUTO: 0 %
IMMATURE CELLS  115398: NORMAL
LYMPHOCYTES # BLD AUTO: 1.5 X10E3/UL (ref 0.7–3.1)
LYMPHOCYTES NFR BLD AUTO: 20 %
MCH RBC QN AUTO: 30.2 PG (ref 26.6–33)
MCHC RBC AUTO-ENTMCNC: 33 G/DL (ref 31.5–35.7)
MCV RBC AUTO: 92 FL (ref 79–97)
MONOCYTES # BLD AUTO: 0.6 X10E3/UL (ref 0.1–0.9)
MONOCYTES NFR BLD AUTO: 8 %
MORPHOLOGY BLD-IMP: NORMAL
NEUTROPHILS # BLD AUTO: 5.4 X10E3/UL (ref 1.4–7)
NEUTROPHILS NFR BLD AUTO: 70 %
NRBC BLD AUTO-RTO: NORMAL %
PLATELET # BLD AUTO: 220 X10E3/UL (ref 150–450)
POTASSIUM SERPL-SCNC: 4.3 MMOL/L (ref 3.5–5.2)
PROT SERPL-MCNC: 6.8 G/DL (ref 6–8.5)
RBC # BLD AUTO: 4.73 X10E6/UL (ref 3.77–5.28)
SODIUM SERPL-SCNC: 142 MMOL/L (ref 134–144)
WBC # BLD AUTO: 7.8 X10E3/UL (ref 3.4–10.8)

## 2020-12-01 NOTE — TELEPHONE ENCOUNTER
Was the patient seen in the last year in this department? Yes, 2/16/2017    Does patient have an active prescription for medications requested? Yes      Last note:    Plan:    Since her MS is relatively stable and back pain is going to be dealt with by physiatry and possibly spine surgery after the effect of the epidural is known I will plan on seeing her in 6 months. His free to call if any thing else is needed before that time.  
[de-identified] : Soft, nontender, incision healed except for a small open area at the lower pole of his incision.  This was packed with a 2 x 2 and cauterized with silver nitrate.

## 2020-12-14 ENCOUNTER — TELEPHONE (OUTPATIENT)
Dept: NEUROLOGY | Facility: MEDICAL CENTER | Age: 65
End: 2020-12-14

## 2020-12-14 NOTE — TELEPHONE ENCOUNTER
Pt called states dr Bloch prescribed Dalfampridine and wanting to know if she is ok to start meds, she said she received meds already.  Called back let her know she is ok to start med. Pt agreed.

## 2020-12-23 NOTE — PROGRESS NOTES
"CHIEF COMPLAINT  Chief Complaint   Patient presents with   • Follow-Up     MS       HPI  Bharti Zabala is a 62 y.o. female who presents for her multiple sclerosis treatment.  MS (multiple sclerosis) ([facility])  Pt states she has been stable with the Glatopa. She has been sometimes light headed with changes in her prednisone from her underlying CAH. Pt was on Avonex and she felt like it caused too many flu like side effects.    Neuropathy ([facility])  Pt with numbness and tingling in her feet and she only feels like she has had these symptoms for a longtime. Right leg weaker and she feels like she gets some drop foot.    REVIEW OF SYSTEMS  Pertinent Positives: Fatigue, occasional headaches, cognitive decline   All other systems are negative.     PAST MEDICAL HISTORY  Past Medical History:   Diagnosis Date   • Anesthesia     \"During my  all my blood went to my feet, during spinal\"   • Bowel habit changes     constipation   • Diabetes ([facility]) 2017    Diet controlled   • High cholesterol    • MS (multiple sclerosis) ([facility])    • Pain 2017    left knee   • Urinary bladder disorder     \"Hx of ongoing bladder infections, none at the moment \"   • Urinary incontinence 2017    Pt uses straight cath bid, \"can't totally empty bladder due to MS\"       SOCIAL HISTORY  Social History     Socioeconomic History   • Marital status:      Spouse name: Not on file   • Number of children: Not on file   • Years of education: Not on file   • Highest education level: Not on file   Occupational History   • Not on file   Social Needs   • Financial resource strain: Not on file   • Food insecurity     Worry: Not on file     Inability: Not on file   • Transportation needs     Medical: Not on file     Non-medical: Not on file   Tobacco Use   • Smoking status: Never Smoker   • Smokeless tobacco: Never Used   Substance and Sexual Activity   • Alcohol use: No   • Drug use: No   • Sexual activity: Not on file   Lifestyle   • Physical " activity     Days per week: Not on file     Minutes per session: Not on file   • Stress: Not on file   Relationships   • Social connections     Talks on phone: Not on file     Gets together: Not on file     Attends Faith service: Not on file     Active member of club or organization: Not on file     Attends meetings of clubs or organizations: Not on file     Relationship status: Not on file   • Intimate partner violence     Fear of current or ex partner: Not on file     Emotionally abused: Not on file     Physically abused: Not on file     Forced sexual activity: Not on file   Other Topics Concern   • Not on file   Social History Narrative   • Not on file       SURGICAL HISTORY  Past Surgical History:   Procedure Laterality Date   • KNEE UNICOMPARTMENTAL Left 5/23/2017    Procedure: KNEE UNICOMPARTMENTAL - MEDIAL [place];  Surgeon: Tim Horn M.D.;  Location: SURGERY [facility];  Service:    • PB CHOLECYSTOENTEROSTOMY  1992   • PRIMARY C SECTION  1991       CURRENT MEDICATIONS  Current Outpatient Medications   Medication Sig Dispense Refill   • Glatiramer Acetate (GLATOPA) 40 MG/ML Solution Prefilled Syringe Inject  under the skin.     • coenzyme Q-10 30 MG capsule Take 60 mg by mouth every day.     • lisinopril (PRINIVIL) 2.5 MG Tab Take 2.5 mg by mouth every day.     • Dalfampridine 10 MG TABLET SR 12 HR Take 10 mg by mouth 2 Times a Day. 180 Tab 3   • Cranberry 450 MG Cap Take  by mouth.     • methenamine hip (HIPPREX) 1 GM Tab Take 1 g by mouth 2 Times a Day.     • oxybutynin SR (DITROPAN-XL) 5 MG TB24 Take 5 mg by mouth every morning.     • atorvastatin (LIPITOR) 20 MG TABS Take 20 mg by mouth every evening.     • predniSONE (DELTASONE) 10 MG TABS Take 10 mg by mouth every day. Take with food      • vitamin D (CHOLECALCIFEROL) 1000 UNIT TABS Take 5,000 Units by mouth every bedtime.       No current facility-administered medications for this visit.        ALLERGIES  Allergies   Allergen Reactions  "  • Sulfa Drugs Rash             PHYSICAL EXAM  VITAL SIGNS: /64 (BP Location: Left arm, Patient Position: Sitting, BP Cuff Size: Adult)   Pulse 80   Temp 36.9 °C (98.5 °F) (Temporal)   Ht 1.676 m (5' 6\")   Wt 70 kg (154 lb 5.2 oz)   SpO2 98%   BMI 24.91 kg/m²   Constitutional: Well developed, Well nourished, No acute distress, Non-toxic appearance.   HENT: Normocephalic atraumatic  Eyes: Fundi disc pale  Neck: Normal range of motion, No tenderness, Supple, No stridor.   Cardiovascular: Normal heart rate, Normal rhythm, No murmurs, No rubs, No gallops.   Thorax & Lungs: Normal breath sounds, No respiratory distress, No wheezing, No chest tenderness.   Abdomen: Bowel sounds normal, Soft, No tenderness, No masses, No pulsatile masses.   Skin: Warm, Dry, No erythema, No rash.   Back: No tenderness, No CVA tenderness.   Extremities: Intact distal pulses, No edema, No tenderness, No cyanosis, No clubbing.   Neurologic: Gait and station are normal say for a little bit of limping due   to her collateral ligament injury.  Romberg is negative.  Strength in the   upper and lower extremities is normal.  Plantar responses are equivocal.  She   can do finger-to-nose, heel-to-shin well.  Fine finger movements are done   well.  She has a little sustained nystagmus on lateral gaze.  Pupils equal,   round and reactive to light.  Fundi appeared to be in normal.  Range of motion   of the neck is normal.  No facial weakness or facial sensory deficit.  Pupils   are reactive to light and equal.  Psychiatric: Affect normal, Judgment normal, Mood normal.   Lab: Reviewed with patient in detail and as noted in results.    EEG  No abnormality    RADIOLOGY/PROCEDURES  MRI reviewed with the patient in the PACS system       Narrative      Pre-and postcontrast examination of the brain  3/18/2014 8:25 AM    HISTORY/REASON FOR EXAM:  Multiple sclerosis.      TECHNIQUE/EXAM DESCRIPTION:   MRI of the brain without and with contrast.    T1 " sagittal, T2 fast spin-echo axial, T1 coronal, FLAIR coronal, Diffusion weighted and Apparent Diffusion Coefficient (ADC map) axial images were obtained of the whole brain. T1 post-contrast axial and T1 post-contrast coronal images were obtained..    The study was performed on a [employer] Signa 1.5 Charis MRI scanner.    20 mL gadolinium contrast was administered intravenously.    COMPARISON:  6/11/00    FINDINGS:     There are multiple T2 hyperintense lesions in the subcortical and periventricular white matter, brainstem and middle cerebellar peduncles. None of the lesions demonstrates contrast enhancement. Most of the lesions are hypointense on T1-weighted sequences. There are no new lesions.    There is no acute infarct. There is no acute or chronic parenchymal hemorrhage. The ventricles, cortical sulci and the basal cisterns appear prominent consistent with mild cerebral and cerebellar atrophy.    The visualized flow voids of the cerebral vasculature appear normal.  There is no large lesion identified in the expected course of the intracranial portions of the cranial nerves.    The skull bones appear normal. The paranasal sinuses are clear. The extracranial soft tissue including orbits appear grossly normal.   Impression       1.  Multifocal T2 hyperintense lesions in the subcortical and periventricular white matter, brainstem and the bilateral middle cerebellar peduncles consistent with chronic plaques of multiple sclerosis. None of the lesions demonstrates contrast enhancement. Most of the lesions are hypointense on T1-weighted sequences. There are no new lesions.  2.  Mild cerebral atrophy.             ASSESSMENT AND PLAN  1. MS (multiple sclerosis) (CMS-HCC)  Plan to continue Glatopa at the current dose. We discussed cognitive concerns in addition to gait stability. We discussed healthy diet and gentle regular exercise program. Vitamin supplementation with vitamin D and disease prevention was covered with the  patient.Pt will continue with exercise and diet. She has lost 60 pounds over the last 2 years and she has felt better.    2. Neuropathy  Pt with peripheral neuropathy idiopathic in addition to her MS which does cause discomfort with long periods on her feet. We also discussed supplements with b and D vitamins.      Follow-up in 6 months     I spent 30 minutes with this patient face to face, over fifty percent was spent counseling patient on their condition, best management practices, reviewing test results and risks and benefits of treatment.

## 2021-01-08 ENCOUNTER — OUTPATIENT INFUSION SERVICES (OUTPATIENT)
Dept: ONCOLOGY | Facility: MEDICAL CENTER | Age: 66
End: 2021-01-08
Attending: FAMILY MEDICINE
Payer: MEDICARE

## 2021-01-08 VITALS
DIASTOLIC BLOOD PRESSURE: 83 MMHG | OXYGEN SATURATION: 98 % | HEART RATE: 89 BPM | SYSTOLIC BLOOD PRESSURE: 121 MMHG | HEIGHT: 66 IN | TEMPERATURE: 97.7 F | WEIGHT: 153 LBS | BODY MASS INDEX: 24.59 KG/M2 | RESPIRATION RATE: 18 BRPM

## 2021-01-08 DIAGNOSIS — M81.0 POSTMENOPAUSAL OSTEOPOROSIS: ICD-10-CM

## 2021-01-08 LAB
CA-I BLD ISE-SCNC: 1.15 MMOL/L (ref 1.1–1.3)
CREAT BLD-MCNC: 0.7 MG/DL (ref 0.5–1.4)

## 2021-01-08 PROCEDURE — 82330 ASSAY OF CALCIUM: CPT

## 2021-01-08 PROCEDURE — 82565 ASSAY OF CREATININE: CPT

## 2021-01-08 PROCEDURE — 96372 THER/PROPH/DIAG INJ SC/IM: CPT

## 2021-01-08 PROCEDURE — 700111 HCHG RX REV CODE 636 W/ 250 OVERRIDE (IP): Mod: JG | Performed by: FAMILY MEDICINE

## 2021-01-08 PROCEDURE — 36415 COLL VENOUS BLD VENIPUNCTURE: CPT

## 2021-01-08 RX ADMIN — DENOSUMAB 60 MG: 60 INJECTION SUBCUTANEOUS at 08:32

## 2021-01-08 ASSESSMENT — FIBROSIS 4 INDEX: FIB4 SCORE: 1.1

## 2021-01-08 NOTE — PROGRESS NOTES
Pt presented to [facility] for Prolia injection. Pt denied planned/recent oral surgery, current illness/infection or s/sx of hypocalcemia. Venipuncture left AC with 25g butterfly.  Istat ionized calcium and creatinine drawn. Gauze and coban dressing to venipuncture site. Ionized calcium within treatable parameters. Pt received Prolia SQ in [place]. Tolerated well. Pt left OPIC in NAD.

## 2021-01-18 DIAGNOSIS — M81.0 OSTEOPOROSIS, UNSPECIFIED OSTEOPOROSIS TYPE, UNSPECIFIED PATHOLOGICAL FRACTURE PRESENCE: ICD-10-CM

## 2021-02-22 ENCOUNTER — OFFICE VISIT (OUTPATIENT)
Dept: NEUROLOGY | Facility: MEDICAL CENTER | Age: 66
End: 2021-02-22
Attending: PSYCHIATRY & NEUROLOGY
Payer: MEDICARE

## 2021-02-22 VITALS
OXYGEN SATURATION: 96 % | DIASTOLIC BLOOD PRESSURE: 72 MMHG | WEIGHT: 153.88 LBS | BODY MASS INDEX: 26.27 KG/M2 | HEART RATE: 105 BPM | TEMPERATURE: 96.4 F | SYSTOLIC BLOOD PRESSURE: 122 MMHG | HEIGHT: 64 IN

## 2021-02-22 DIAGNOSIS — G62.9 NEUROPATHY: ICD-10-CM

## 2021-02-22 DIAGNOSIS — G35 MULTIPLE SCLEROSIS (HCC): ICD-10-CM

## 2021-02-22 DIAGNOSIS — H33.21 RIGHT RETINAL DETACHMENT: ICD-10-CM

## 2021-02-22 PROCEDURE — 99211 OFF/OP EST MAY X REQ PHY/QHP: CPT | Performed by: PSYCHIATRY & NEUROLOGY

## 2021-02-22 PROCEDURE — 99214 OFFICE O/P EST MOD 30 MIN: CPT | Performed by: PSYCHIATRY & NEUROLOGY

## 2021-02-22 ASSESSMENT — FIBROSIS 4 INDEX: FIB4 SCORE: 1.113693180368812351

## 2021-02-22 NOTE — ASSESSMENT & PLAN NOTE
Pt states that hse has been doing ok with the mS and she feels a tingling in her feet. Glatopa works ok and she takes prednisone already. Pt is trying to walk more with the dalfampridine and she has no depth perception now because of the eye.. Pt had a set back. Pt had the surgery at [facility] and she brought labs that are ok. 8.34. Pt is eating a healthy diet and she lost 65 pounds.

## 2021-03-02 NOTE — ASSESSMENT & PLAN NOTE
Patient does feel like her neuropathy is getting somewhat worse in her feet and her hands as far as numbness goes.  She does not have significant pain.  She wishes she could walk longer but then sometimes her feet bother her.

## 2021-03-02 NOTE — PROGRESS NOTES
"CHIEF COMPLAINT  Chief Complaint   Patient presents with   • Follow-Up     MS        HPI  Bharti Zabala is a 62 y.o. female who presents for her multiple sclerosis treatment.  Right retinal detachment  Pt states that she had surgery OD and it has helped her a lot. Pt still has little black spots.    MS (multiple sclerosis) ([facility])  Pt states that hse has been doing ok with the mS and she feels a tingling in her feet. Glatopa works ok and she takes prednisone already. Pt is trying to walk more with the dalfampridine and she has no depth perception now because of the eye.. Pt had a set back. Pt had the surgery at [facility] and she brought labs that are ok. 8.34. Pt is eating a healthy diet and she lost 65 pounds.    Neuropathy ([facility])  Patient does feel like her neuropathy is getting somewhat worse in her feet and her hands as far as numbness goes.  She does not have significant pain.  She wishes she could walk longer but then sometimes her feet bother her.    REVIEW OF SYSTEMS  Pertinent Positives: Fatigue, occasional headaches, cognitive decline   All other systems are negative.     PAST MEDICAL HISTORY  Past Medical History:   Diagnosis Date   • Anesthesia     \"During my  all my blood went to my feet, during spinal\"   • Bowel habit changes     constipation   • Diabetes ([facility]) 2017    Diet controlled   • High cholesterol    • MS (multiple sclerosis) ([facility])    • Pain 2017    left knee   • Urinary bladder disorder     \"Hx of ongoing bladder infections, none at the moment \"   • Urinary incontinence 2017    Pt uses straight cath bid, \"can't totally empty bladder due to MS\"       SOCIAL HISTORY  Social History     Socioeconomic History   • Marital status:      Spouse name: Not on file   • Number of children: Not on file   • Years of education: Not on file   • Highest education level: Not on file   Occupational History   • Not on file   Tobacco Use   • Smoking status: Never Smoker   • " Smokeless tobacco: Never Used   Substance and Sexual Activity   • Alcohol use: No   • Drug use: No   • Sexual activity: Not on file   Other Topics Concern   • Not on file   Social History Narrative   • Not on file     Social Determinants of Health     Financial Resource Strain:    • Difficulty of Paying Living Expenses:    Food Insecurity:    • Worried About Running Out of Food in the Last Year:    • Ran Out of Food in the Last Year:    Transportation Needs:    • Lack of Transportation (Medical):    • Lack of Transportation (Non-Medical):    Physical Activity:    • Days of Exercise per Week:    • Minutes of Exercise per Session:    Stress:    • Feeling of Stress :    Social Connections:    • Frequency of Communication with Friends and Family:    • Frequency of Social Gatherings with Friends and Family:    • Attends Rastafari Services:    • Active Member of Clubs or Organizations:    • Attends Club or Organization Meetings:    • Marital Status:    Intimate Partner Violence:    • Fear of Current or Ex-Partner:    • Emotionally Abused:    • Physically Abused:    • Sexually Abused:        SURGICAL HISTORY  Past Surgical History:   Procedure Laterality Date   • KNEE UNICOMPARTMENTAL Left 5/23/2017    Procedure: KNEE UNICOMPARTMENTAL - MEDIAL [place];  Surgeon: Tim Horn M.D.;  Location: SURGERY [facility];  Service:    • PB CHOLECYSTOENTEROSTOMY  1992   • PRIMARY C SECTION  1991       CURRENT MEDICATIONS  Current Outpatient Medications   Medication Sig Dispense Refill   • Calcium Carbonate (CALCIUM 500 PO) Take 1 Tab by mouth 2 (two) times a day.     • Glatiramer Acetate (GLATOPA) 40 MG/ML Solution Prefilled Syringe Inject  under the skin.     • coenzyme Q-10 30 MG capsule Take 60 mg by mouth every day.     • lisinopril (PRINIVIL) 2.5 MG Tab Take 2.5 mg by mouth every day.     • Dalfampridine 10 MG TABLET SR 12 HR Take 10 mg by mouth 2 Times a Day. 180 Tab 3   • Cranberry 450 MG Cap Take  by mouth.     •  "methenamine hip (HIPPREX) 1 GM Tab Take 1 g by mouth 2 Times a Day.     • oxybutynin SR (DITROPAN-XL) 5 MG TB24 Take 5 mg by mouth every morning.     • atorvastatin (LIPITOR) 20 MG TABS Take 20 mg by mouth every evening.     • predniSONE (DELTASONE) 10 MG TABS Take 10 mg by mouth every day. Take with food      • vitamin D (CHOLECALCIFEROL) 1000 UNIT TABS Take 5,000 Units by mouth every bedtime.       No current facility-administered medications for this visit.       ALLERGIES  Allergies   Allergen Reactions   • Sulfa Drugs Rash             PHYSICAL EXAM  VITAL SIGNS: /72 (BP Location: Right arm, Patient Position: Sitting, BP Cuff Size: Adult)   Pulse (!) 105   Temp (!) 35.8 °C (96.4 °F) (Temporal)   Ht 1.626 m (5' 4\")   Wt 69.8 kg (153 lb 14.1 oz)   SpO2 96%   BMI 26.41 kg/m²   Constitutional: Well developed, Well nourished, No acute distress, Non-toxic appearance.   HENT: Normocephalic atraumatic  Eyes: Fundi disc pale  Neck: Normal range of motion, No tenderness, Supple, No stridor.   Cardiovascular: Normal heart rate, Normal rhythm, No murmurs, No rubs, No gallops.   Thorax & Lungs: Normal breath sounds, No respiratory distress, No wheezing, No chest tenderness.   Abdomen: Bowel sounds normal, Soft, No tenderness, No masses, No pulsatile masses.   Skin: Warm, Dry, No erythema, No rash.   Back: No tenderness, No CVA tenderness.   Extremities: Intact distal pulses, No edema, No tenderness, No cyanosis, No clubbing.   Neurologic: Gait and station are normal say for a little bit of limping due   to her collateral ligament injury.  Romberg is negative.  Strength in the   upper and lower extremities is normal.  Plantar responses are equivocal.  She   can do finger-to-nose, heel-to-shin well.  Fine finger movements are done   well.  She has a little sustained nystagmus on lateral gaze.  Pupils equal,   round and reactive to light.  Fundi appeared to be in normal.  Range of motion   of the neck is normal.  No " facial weakness or facial sensory deficit.  Pupils   are reactive to light and equal.  Psychiatric: Affect normal, Judgment normal, Mood normal.   Lab: Reviewed with patient in detail and as noted in results.    EEG  No abnormality    RADIOLOGY/PROCEDURES  MRI reviewed with the patient in the PACS system       Narrative      Pre-and postcontrast examination of the brain  3/18/2014 8:25 AM    HISTORY/REASON FOR EXAM:  Multiple sclerosis.      TECHNIQUE/EXAM DESCRIPTION:   MRI of the brain without and with contrast.    T1 sagittal, T2 fast spin-echo axial, T1 coronal, FLAIR coronal, Diffusion weighted and Apparent Diffusion Coefficient (ADC map) axial images were obtained of the whole brain. T1 post-contrast axial and T1 post-contrast coronal images were obtained..    The study was performed on a [employer] Signa 1.5 Charis MRI scanner.    20 mL gadolinium contrast was administered intravenously.    COMPARISON:  6/11/00    FINDINGS:     There are multiple T2 hyperintense lesions in the subcortical and periventricular white matter, brainstem and middle cerebellar peduncles. None of the lesions demonstrates contrast enhancement. Most of the lesions are hypointense on T1-weighted sequences. There are no new lesions.    There is no acute infarct. There is no acute or chronic parenchymal hemorrhage. The ventricles, cortical sulci and the basal cisterns appear prominent consistent with mild cerebral and cerebellar atrophy.    The visualized flow voids of the cerebral vasculature appear normal.  There is no large lesion identified in the expected course of the intracranial portions of the cranial nerves.    The skull bones appear normal. The paranasal sinuses are clear. The extracranial soft tissue including orbits appear grossly normal.   Impression       1.  Multifocal T2 hyperintense lesions in the subcortical and periventricular white matter, brainstem and the bilateral middle cerebellar peduncles consistent with chronic  plaques of multiple sclerosis. None of the lesions demonstrates contrast enhancement. Most of the lesions are hypointense on T1-weighted sequences. There are no new lesions.  2.  Mild cerebral atrophy.             ASSESSMENT AND PLAN  1. MS (multiple sclerosis) (CMS-[facility])  Plan to continue Glatopa low she liked Copaxone better.  We did consider stopping therapy in the future.  We discussed cognitive concerns in addition to gait stability. We discussed healthy diet and gentle regular exercise program. Vitamin supplementation with vitamin D and disease prevention was covered with the patient.Pt will continue with exercise and diet. She has lost 65 pounds over the last 2 years and she has felt better.    2. Neuropathy  Pt with peripheral neuropathy idiopathic in addition to her MS which does cause discomfort with long periods on her feet. We also discussed supplements with b and D vitamins.    3.  Retinal detachment  Patient is going to continue monitoring with her ophthalmologist and retinal specialist.  She is monitoring for any vision changes.    Follow-up in 3 months     I spent 34 minutes with this patient face to face, over fifty percent was spent counseling patient on their condition, best management practices, reviewing test results and risks and benefits of treatment.

## 2021-03-03 DIAGNOSIS — Z23 NEED FOR VACCINATION: ICD-10-CM

## 2021-06-04 ENCOUNTER — HOSPITAL ENCOUNTER (OUTPATIENT)
Dept: RADIOLOGY | Facility: MEDICAL CENTER | Age: 66
End: 2021-06-04

## 2021-06-04 ENCOUNTER — HOSPITAL ENCOUNTER (OUTPATIENT)
Dept: RADIOLOGY | Facility: MEDICAL CENTER | Age: 66
End: 2021-06-04
Attending: OBSTETRICS & GYNECOLOGY
Payer: MEDICARE

## 2021-06-04 DIAGNOSIS — N64.4 BREAST PAIN: ICD-10-CM

## 2021-06-04 DIAGNOSIS — R92.2 DENSE BREASTS: ICD-10-CM

## 2021-06-04 DIAGNOSIS — R92.30 DENSE BREASTS: ICD-10-CM

## 2021-06-04 PROCEDURE — G0279 TOMOSYNTHESIS, MAMMO: HCPCS

## 2021-06-04 PROCEDURE — 76642 ULTRASOUND BREAST LIMITED: CPT | Mod: LT

## 2021-06-07 ENCOUNTER — OFFICE VISIT (OUTPATIENT)
Dept: NEUROLOGY | Facility: MEDICAL CENTER | Age: 66
End: 2021-06-07
Attending: PSYCHIATRY & NEUROLOGY
Payer: MEDICARE

## 2021-06-07 VITALS
SYSTOLIC BLOOD PRESSURE: 116 MMHG | TEMPERATURE: 97.8 F | WEIGHT: 152.12 LBS | OXYGEN SATURATION: 95 % | HEART RATE: 89 BPM | HEIGHT: 64 IN | DIASTOLIC BLOOD PRESSURE: 68 MMHG | BODY MASS INDEX: 25.97 KG/M2

## 2021-06-07 DIAGNOSIS — G35 MULTIPLE SCLEROSIS (HCC): ICD-10-CM

## 2021-06-07 DIAGNOSIS — S09.90XA HEAD INJURY, ACUTE, INITIAL ENCOUNTER: ICD-10-CM

## 2021-06-07 PROCEDURE — 99214 OFFICE O/P EST MOD 30 MIN: CPT | Performed by: PSYCHIATRY & NEUROLOGY

## 2021-06-07 PROCEDURE — 99212 OFFICE O/P EST SF 10 MIN: CPT | Performed by: PSYCHIATRY & NEUROLOGY

## 2021-06-07 ASSESSMENT — PATIENT HEALTH QUESTIONNAIRE - PHQ9: CLINICAL INTERPRETATION OF PHQ2 SCORE: 0

## 2021-06-07 ASSESSMENT — FIBROSIS 4 INDEX: FIB4 SCORE: 1.113693180368812351

## 2021-06-07 NOTE — ASSESSMENT & PLAN NOTE
Pt has had 3 falls in May. Pt states that she did not  her foot and she went down. Pt had black eye and a goose egg on her head from hitting the a cement curb at mini golf out of town.  Pt had bilateral cataract surgeries and not sure if the change in vision is partially to blame in addition to her MS weakness and balance issues.

## 2021-06-08 NOTE — PROGRESS NOTES
"Chief Complaint   Patient presents with   • Follow-Up     MS       Problem List Items Addressed This Visit     MS (multiple sclerosis) ([facility])     Pt has had 3 falls in May. Pt states that she did not  her foot and she went down. Pt had black eye and a goose egg on her head from hitting the a cement curb at mini golf out of town.  Pt had bilateral cataract surgeries and not sure if the change in vision is partially to blame in addition to her MS weakness and balance issues.         Relevant Orders    MR-BRAIN-WITH & W/O    MR-CERVICAL SPINE-W/O    CBC WITH DIFFERENTIAL    Comp Metabolic Panel    Head injury, acute, initial encounter          History of present illness:  Bharti Zabala 66 y.o. female presents today recent head trauma after a fall where she hit her head on a cement curb.  Patient also feels her general balance and walking is not as good as it used to be.    Lumbar puncture: Yes    Past DMA’s: Copaxone        Current DMA regimen: Glatiramer acetate    Prior neurologists: Dr. Small    Prior brain imaging: Yes    Currently employed: No     Past medical history:   Past Medical History:   Diagnosis Date   • Anesthesia     \"During my  all my blood went to my feet, during spinal\"   • Bowel habit changes     constipation   • Diabetes (HCC) 2017    Diet controlled   • High cholesterol    • MS (multiple sclerosis) ([facility])    • Pain 2017    left knee   • Urinary bladder disorder     \"Hx of ongoing bladder infections, none at the moment \"   • Urinary incontinence 2017    Pt uses straight cath bid, \"can't totally empty bladder due to MS\"       Past surgical history:   Past Surgical History:   Procedure Laterality Date   • KNEE UNICOMPARTMENTAL Left 2017    Procedure: KNEE UNICOMPARTMENTAL - MEDIAL [place];  Surgeon: Tim Horn M.D.;  Location: SURGERY [facility];  Service:    • PB CHOLECYSTOENTEROSTOMY     • PRIMARY C SECTION         Family history:   Family History "   Problem Relation Age of Onset   • Cancer Other        Social history:   Social History     Socioeconomic History   • Marital status:      Spouse name: Not on file   • Number of children: Not on file   • Years of education: Not on file   • Highest education level: Not on file   Occupational History   • Not on file   Tobacco Use   • Smoking status: Never Smoker   • Smokeless tobacco: Never Used   Substance and Sexual Activity   • Alcohol use: No   • Drug use: No   • Sexual activity: Not on file   Other Topics Concern   • Not on file   Social History Narrative   • Not on file     Social Determinants of Health     Financial Resource Strain:    • Difficulty of Paying Living Expenses:    Food Insecurity:    • Worried About Running Out of Food in the Last Year:    • Ran Out of Food in the Last Year:    Transportation Needs:    • Lack of Transportation (Medical):    • Lack of Transportation (Non-Medical):    Physical Activity:    • Days of Exercise per Week:    • Minutes of Exercise per Session:    Stress:    • Feeling of Stress :    Social Connections:    • Frequency of Communication with Friends and Family:    • Frequency of Social Gatherings with Friends and Family:    • Attends Spiritism Services:    • Active Member of Clubs or Organizations:    • Attends Club or Organization Meetings:    • Marital Status:    Intimate Partner Violence:    • Fear of Current or Ex-Partner:    • Emotionally Abused:    • Physically Abused:    • Sexually Abused:        Current medications:   Current Outpatient Medications   Medication   • Rosuvastatin Calcium 40 MG CAPSULE SPRINKLE   • Calcium Carbonate (CALCIUM 500 PO)   • Glatiramer Acetate (GLATOPA) 40 MG/ML Solution Prefilled Syringe   • coenzyme Q-10 30 MG capsule   • lisinopril (PRINIVIL) 2.5 MG Tab   • Dalfampridine 10 MG TABLET SR 12 HR   • Cranberry 450 MG Cap   • methenamine hip (HIPPREX) 1 GM Tab   • oxybutynin SR (DITROPAN-XL) 5 MG TB24   • predniSONE (DELTASONE) 10 MG  TABS   • vitamin D (CHOLECALCIFEROL) 1000 UNIT TABS     No current facility-administered medications for this visit.       Medication Allergy:  Allergies   Allergen Reactions   • Sulfa Drugs Rash             Review of systems:   Constitutional: denies fever, night sweats, weight loss, or malaise/fatigue.   Eyes: denies acute vision change, eye pain or secretion.   Ears, Nose, Mouth, Throat: denies nasal secretion, sinus pain, nasal bleeding, difficulty swallowing, sore pain, hearing loss, tinnitus, vertigo, ear pain, acute dental problems, oral ulcers or lesions.   Endocrine: denies recent weight changes, heat or cold intolerance, neck pain or swelling, polyuria, polydypsia, polyphagia,abnormal hair growth.  Cardiovascular: denies new onset of chest pain, palpitations, syncope, lower extremity edema, cyanosis, claudication, orthopnea, or dyspnea of exertion.  Pulmonary: denies shortness of breath, new onset of cough, hemoptysis, wheezing, chest pain or flu-like symptoms.   GI: denies nausea, vomiting, diarrhea, GI bleeding, change in appetite, abdominal pain, and change in bowel habits.  : denies dysuria, frequency, urgency, urinary incontinence, hematuria. Heme/oncology: denies history of easy bruising or bleeding. No history of cancer. Allergy/immunology: denies hives/urticarial, no reports of runny nose or itchy eyes. Dermatologic: denies new rash, new/growing/changing skin lesions. Musculoskeletal:denies joint swelling or pain, muscle pain, neck and back pain. Neurologic: denies headaches, acute visual changes, facial droopiness, muscle weakness (focal or generalized), paresthesias, anesthesia, ataxia, change in speech or language, memory loss, abnormal movements, seizures, loss of consciousness, or episodes of confusion.   Psychiatric: denies symptoms of depression, anxiety, hallucinations, mood swings or changes, suicidal or homicidal thoughts.     Physical examination:   Vitals:    06/07/21 1528   BP:  "116/68   BP Location: Right arm   Patient Position: Sitting   BP Cuff Size: Adult   Pulse: 89   Temp: 36.6 °C (97.8 °F)   TempSrc: Temporal   SpO2: 95%   Weight: 69 kg (152 lb 1.9 oz)   Height: 1.626 m (5' 4\")     General: Patient in no acute distress, pleasant and cooperative.  HEENT: Normocephalic, no signs of acute trauma.   Neck: supple, no meningeal signs or carotid bruits. There is normal range of motion. No tenderness on exam.   Chest: clear to auscultation. No cough.   CV: RRR, no murmurs.   Abdomen: soft, non distended or tender.   Skin: no signs of acute rashes or trauma.   Musculoskeletal: joints exhibit full range of motion, without any pain to palpation. There are no signs of joint or muscle swelling. There is no tenderness to deep palpation of muscles.   Psychiatric: No hallucinatory behavior. Denies symptoms of depression or suicidal ideation. Mood and affect appear normal on exam.     NEUROLOGICAL EXAM:   Mental status, orientation: Awake, alert and fully oriented.   Speech and language: speech is clear and fluent. The patient is able to name, repeat and comprehend.   Memory: There is intact recollection of recent and remote events.   Cranial nerve exam: Pupils are 3-4 mm bilaterally and equally reactive to light and accommodation. Visual fields are intact by confrontation. Fundoscopic exam was unremarkable. There is no nystagmus on primary or secondary gaze. Intact full EOM in all directions of gaze. Face appears symmetric. Sensation in the face is intact to light touch. Uvula is midline. Palate elevates symmetrically. Tongue is midline and without any signs of tongue biting or fasciculations. Sternocleidomastoid muscles exhibit is normal strength bilaterally. Shoulder shrug is intact bilaterally.   Motor exam: Strength is 5/5 in all extremities except right leg is 4 out of 5. Tone is abnormal. No abnormal movements were seen on exam.   Sensory exam reveals abnormal sense of light touch, " proprioception, vibration and pinprick in all extremities.   Deep tendon reflexes:  2+ throughout. Plantar responses are flexor. There is no clonus.   Coordination: shows a normal finger-nose-finger. Normal rapidly alternating movements.   Gait: The patient was able to get up from seated position on first attempt without requiring assistance. Found to be steady when walking. Movements were fluid with normal arm swing. The patient was able to turn without difficulties or tendency to fall.     ANCILLARY DATA REVIEWED:     Lab Data Review:  No results found for this or any previous visit (from the past 24 hour(s)).    Imaging: Reviewed previous MRI imaging in the PACS system.    ASSESSMENT AND PLAN:    1. MS (multiple sclerosis) (HCC)  Because of the recent head trauma plan to reimage brain and cervical spine.  The images will be done both for MS progression and also for potential head trauma sequelae.  - MR-BRAIN-WITH & W/O; Future  - MR-CERVICAL SPINE-W/O; Future  - CBC WITH DIFFERENTIAL; Future  - Comp Metabolic Panel; Future    2. Head injury, acute, initial encounter  MRI scan ordered and also refer to [facility].  I called Korin Brewer and discussed the case with her so she can get inpatient as soon as possible.  Referral put in.     I spent 38 minutes with this patient, over fifty percent was spent counseling patient on their condition, best management practices, reviewing test results and risks and benefits of treatment.    FOLLOW-UP:   Return in about 11 weeks (around 8/23/2021).    EDUCATION AND COUNSELING:  The patient/family educated on diagnosis and prognosis. They understand MS is a chronic progressive disorder for which there is currently no cure. Despite best efforts in management, the condition is likely to progress and carries significant risk for disability including physical and cognitive.   -Effectiveness, indications, and safety profile of available Disease Modifying  Agents (DMA’s) reviewed.   -Side effects of DMA’s were discussed, including: flu like symptoms, myalgias, injection site (infection, pain, bleeding), abnormal LFT’s, pancreatitis, weight changes, development of autoantibodies which may decrease effective of the medication, panic/anxiety attacks, abnormal blood counts (increase risk for bleeding, infections, cancer), increased risk for fetal complications (including congenital malformations, developmental/intellectual disability).    -The patient will require frequent follow up and monitoring.   -Laboratory monitoring every 12 months: CBC, CMP, thyroid panel, vitamin D, UA.   -Imaging of entire neuro-axis (brain and spinal cord) with MRI’s w/wo contrast, every 12-24 months.   -Patient/family counseled on medication compliance.       Melissa Bloch, MD  Clinical  of Neurology [facility].   Diplomate in Neurology.   Office: [phone]  Fax: [phone]

## 2021-06-09 LAB
ALBUMIN SERPL-MCNC: 4.4 G/DL (ref 3.8–4.8)
ALBUMIN/GLOB SERPL: 1.8 {RATIO} (ref 1.2–2.2)
ALP SERPL-CCNC: 59 IU/L (ref 48–121)
ALT SERPL-CCNC: 22 IU/L (ref 0–32)
AST SERPL-CCNC: 18 IU/L (ref 0–40)
BASOPHILS # BLD AUTO: 0 X10E3/UL (ref 0–0.2)
BASOPHILS NFR BLD AUTO: 1 %
BILIRUB SERPL-MCNC: 0.4 MG/DL (ref 0–1.2)
BUN SERPL-MCNC: 15 MG/DL (ref 8–27)
BUN/CREAT SERPL: 25 (ref 12–28)
CALCIUM SERPL-MCNC: 9.3 MG/DL (ref 8.7–10.3)
CHLORIDE SERPL-SCNC: 105 MMOL/L (ref 96–106)
CO2 SERPL-SCNC: 25 MMOL/L (ref 20–29)
CREAT SERPL-MCNC: 0.61 MG/DL (ref 0.57–1)
EOSINOPHIL # BLD AUTO: 0.1 X10E3/UL (ref 0–0.4)
EOSINOPHIL NFR BLD AUTO: 2 %
ERYTHROCYTE [DISTWIDTH] IN BLOOD BY AUTOMATED COUNT: 12.7 % (ref 11.7–15.4)
GLOBULIN SER CALC-MCNC: 2.4 G/DL (ref 1.5–4.5)
GLUCOSE SERPL-MCNC: 93 MG/DL (ref 65–99)
HCT VFR BLD AUTO: 39.7 % (ref 34–46.6)
HGB BLD-MCNC: 12.9 G/DL (ref 11.1–15.9)
IMM GRANULOCYTES # BLD AUTO: 0 X10E3/UL (ref 0–0.1)
IMM GRANULOCYTES NFR BLD AUTO: 1 %
IMMATURE CELLS  115398: NORMAL
LYMPHOCYTES # BLD AUTO: 1.8 X10E3/UL (ref 0.7–3.1)
LYMPHOCYTES NFR BLD AUTO: 24 %
MCH RBC QN AUTO: 30.1 PG (ref 26.6–33)
MCHC RBC AUTO-ENTMCNC: 32.5 G/DL (ref 31.5–35.7)
MCV RBC AUTO: 93 FL (ref 79–97)
MONOCYTES # BLD AUTO: 0.9 X10E3/UL (ref 0.1–0.9)
MONOCYTES NFR BLD AUTO: 12 %
MORPHOLOGY BLD-IMP: NORMAL
NEUTROPHILS # BLD AUTO: 4.7 X10E3/UL (ref 1.4–7)
NEUTROPHILS NFR BLD AUTO: 60 %
NRBC BLD AUTO-RTO: NORMAL %
PLATELET # BLD AUTO: 224 X10E3/UL (ref 150–450)
POTASSIUM SERPL-SCNC: 3.9 MMOL/L (ref 3.5–5.2)
PROT SERPL-MCNC: 6.8 G/DL (ref 6–8.5)
RBC # BLD AUTO: 4.28 X10E6/UL (ref 3.77–5.28)
SODIUM SERPL-SCNC: 141 MMOL/L (ref 134–144)
WBC # BLD AUTO: 7.5 X10E3/UL (ref 3.4–10.8)

## 2021-07-06 ENCOUNTER — HOSPITAL ENCOUNTER (OUTPATIENT)
Dept: RADIOLOGY | Facility: MEDICAL CENTER | Age: 66
End: 2021-07-06
Attending: PSYCHIATRY & NEUROLOGY
Payer: MEDICARE

## 2021-07-06 DIAGNOSIS — G35 MULTIPLE SCLEROSIS (HCC): ICD-10-CM

## 2021-07-06 PROCEDURE — A9576 INJ PROHANCE MULTIPACK: HCPCS

## 2021-07-06 PROCEDURE — 700117 HCHG RX CONTRAST REV CODE 255

## 2021-07-06 PROCEDURE — 72141 MRI NECK SPINE W/O DYE: CPT | Mod: MG

## 2021-07-06 PROCEDURE — 70553 MRI BRAIN STEM W/O & W/DYE: CPT | Mod: MG

## 2021-07-06 RX ADMIN — GADOTERIDOL 15 ML: 279.3 INJECTION, SOLUTION INTRAVENOUS at 11:22

## 2021-07-09 ENCOUNTER — OUTPATIENT INFUSION SERVICES (OUTPATIENT)
Dept: ONCOLOGY | Facility: MEDICAL CENTER | Age: 66
End: 2021-07-09
Attending: FAMILY MEDICINE
Payer: MEDICARE

## 2021-07-09 VITALS
SYSTOLIC BLOOD PRESSURE: 106 MMHG | DIASTOLIC BLOOD PRESSURE: 61 MMHG | HEART RATE: 68 BPM | WEIGHT: 153 LBS | BODY MASS INDEX: 24.01 KG/M2 | TEMPERATURE: 98.1 F | RESPIRATION RATE: 18 BRPM | HEIGHT: 67 IN

## 2021-07-09 DIAGNOSIS — M81.0 OSTEOPOROSIS, UNSPECIFIED OSTEOPOROSIS TYPE, UNSPECIFIED PATHOLOGICAL FRACTURE PRESENCE: ICD-10-CM

## 2021-07-09 LAB
CA-I BLD ISE-SCNC: 1.23 MMOL/L (ref 1.1–1.3)
CREAT BLD-MCNC: 0.7 MG/DL (ref 0.5–1.4)

## 2021-07-09 PROCEDURE — 700111 HCHG RX REV CODE 636 W/ 250 OVERRIDE (IP): Mod: JG | Performed by: FAMILY MEDICINE

## 2021-07-09 PROCEDURE — 36415 COLL VENOUS BLD VENIPUNCTURE: CPT

## 2021-07-09 PROCEDURE — 96372 THER/PROPH/DIAG INJ SC/IM: CPT

## 2021-07-09 PROCEDURE — 82330 ASSAY OF CALCIUM: CPT

## 2021-07-09 PROCEDURE — 82565 ASSAY OF CREATININE: CPT

## 2021-07-09 RX ADMIN — DENOSUMAB 60 MG: 60 INJECTION SUBCUTANEOUS at 08:41

## 2021-07-09 ASSESSMENT — FIBROSIS 4 INDEX: FIB4 SCORE: 1.13

## 2021-07-09 NOTE — PROGRESS NOTES
Patient presented to [facility] for Prolia injection. She denied planned/recent oral surgery, current illness/infection or s/sx of hypocalcemia. Venipuncture right AC with 25g butterfly.  Istat ionized calcium and creatinine drawn. Gauze and coban dressing to venipuncture site. Ionized calcium within treatable parameters. Patient received Prolia SQ in right upper arm. Tolerated well. Next appointment scheduled, date and time confirmed with patient who left [facility] in no apparent distress.

## 2021-08-23 ENCOUNTER — OFFICE VISIT (OUTPATIENT)
Dept: NEUROLOGY | Facility: MEDICAL CENTER | Age: 66
End: 2021-08-23
Attending: PSYCHIATRY & NEUROLOGY
Payer: MEDICARE

## 2021-08-23 VITALS
DIASTOLIC BLOOD PRESSURE: 62 MMHG | BODY MASS INDEX: 26.35 KG/M2 | WEIGHT: 154.32 LBS | TEMPERATURE: 97 F | OXYGEN SATURATION: 97 % | HEART RATE: 98 BPM | HEIGHT: 64 IN | SYSTOLIC BLOOD PRESSURE: 100 MMHG

## 2021-08-23 DIAGNOSIS — G35 MULTIPLE SCLEROSIS (HCC): ICD-10-CM

## 2021-08-23 DIAGNOSIS — H33.21 RIGHT RETINAL DETACHMENT: ICD-10-CM

## 2021-08-23 DIAGNOSIS — G62.9 NEUROPATHY: ICD-10-CM

## 2021-08-23 PROBLEM — E25.9 ADRENOGENITAL DISORDER (HCC): Status: ACTIVE | Noted: 2021-08-23

## 2021-08-23 PROCEDURE — 99215 OFFICE O/P EST HI 40 MIN: CPT | Performed by: PSYCHIATRY & NEUROLOGY

## 2021-08-23 PROCEDURE — 99211 OFF/OP EST MAY X REQ PHY/QHP: CPT | Performed by: PSYCHIATRY & NEUROLOGY

## 2021-08-23 RX ORDER — ACYCLOVIR 400 MG/1
TABLET ORAL
COMMUNITY

## 2021-08-23 RX ORDER — DALFAMPRIDINE 10 MG/1
TABLET, FILM COATED, EXTENDED RELEASE ORAL
COMMUNITY
End: 2021-11-04

## 2021-08-23 ASSESSMENT — FIBROSIS 4 INDEX: FIB4 SCORE: 1.13

## 2021-08-23 NOTE — ASSESSMENT & PLAN NOTE
Pt states she feels like her legs last only about an hour.  Pat has been working on going through things in her home as she is considering a move.  She is cleaning and putting away pictures and getting ready to stage the home.  She and her  are looking at moving into a smaller home in a 55+ community and/or looking at a home in [facility] where she has family.  Patient would like to spend more time with her 5-year-old granddaughter doing activities that require walking such as going to [facility].  She is concerned that she will need a wheelchair to do prolonged activities or a wheeled walker with a seat.  Patient is not sure what benefit she has through her insurance.  Patient is taking the Ampyra to help with her walking.  25 TWT 6.84 patient has noted some change in her cognitive abilities.

## 2021-08-24 NOTE — ASSESSMENT & PLAN NOTE
Patient feels she does get some benefit from the gabapentin for her neuropathy.  She does not feel it greatly contributes to her ability to walk long distances but does make it more difficult on uneven surfaces.  Patient also has balance issues.

## 2021-08-24 NOTE — PROGRESS NOTES
"CHIEF COMPLAINT  Chief Complaint   Patient presents with   • Follow-Up     MS       HPI  Bharti Zabala is a 62 y.o. female who presents for her multiple sclerosis treatment.  MS (multiple sclerosis) ([facility])  Pt states she feels like her legs last only about an hour.  Pat has been working on going through things in her home as she is considering a move.  She is cleaning and putting away pictures and getting ready to stage the home.  She and her  are looking at moving into a smaller home in a 55+ community and/or looking at a home in [facility] where she has family.  Patient would like to spend more time with her 5-year-old granddaughter doing activities that require walking such as going to [email or web address].  She is concerned that she will need a wheelchair to do prolonged activities or a wheeled walker with a seat.  Patient is not sure what benefit she has through her insurance.  Patient is taking the Ampyra to help with her walking.  25 TWT 6.84 patient has noted some change in her cognitive abilities.    Neuropathy ([facility])  Patient feels she does get some benefit from the gabapentin for her neuropathy.  She does not feel it greatly contributes to her ability to walk long distances but does make it more difficult on uneven surfaces.  Patient also has balance issues.    REVIEW OF SYSTEMS  Pertinent Positives: Fatigue, occasional headaches, cognitive decline   All other systems are negative.     PAST MEDICAL HISTORY  Past Medical History:   Diagnosis Date   • Anesthesia     \"During my  all my blood went to my feet, during spinal\"   • Bowel habit changes     constipation   • Diabetes ([facility]) 2017    Diet controlled   • High cholesterol    • MS (multiple sclerosis) ([facility])    • Pain 2017    left knee   • Urinary bladder disorder     \"Hx of ongoing bladder infections, none at the moment \"   • Urinary incontinence 2017    Pt uses straight cath bid, \"can't totally empty bladder due to MS\" "       SOCIAL HISTORY  Social History     Socioeconomic History   • Marital status:      Spouse name: Not on file   • Number of children: Not on file   • Years of education: Not on file   • Highest education level: Not on file   Occupational History   • Not on file   Tobacco Use   • Smoking status: Never Smoker   • Smokeless tobacco: Never Used   Substance and Sexual Activity   • Alcohol use: No   • Drug use: No   • Sexual activity: Not on file   Other Topics Concern   • Not on file   Social History Narrative   • Not on file     Social Determinants of Health     Financial Resource Strain:    • Difficulty of Paying Living Expenses:    Food Insecurity:    • Worried About Running Out of Food in the Last Year:    • Ran Out of Food in the Last Year:    Transportation Needs:    • Lack of Transportation (Medical):    • Lack of Transportation (Non-Medical):    Physical Activity:    • Days of Exercise per Week:    • Minutes of Exercise per Session:    Stress:    • Feeling of Stress :    Social Connections:    • Frequency of Communication with Friends and Family:    • Frequency of Social Gatherings with Friends and Family:    • Attends [employer] Services:    • Active Member of Clubs or Organizations:    • Attends Club or Organization Meetings:    • Marital Status:    Intimate Partner Violence:    • Fear of Current or Ex-Partner:    • Emotionally Abused:    • Physically Abused:    • Sexually Abused:        SURGICAL HISTORY  Past Surgical History:   Procedure Laterality Date   • KNEE UNICOMPARTMENTAL Left 5/23/2017    Procedure: KNEE UNICOMPARTMENTAL - MEDIAL [place];  Surgeon: Tim Horn M.D.;  Location: SURGERY [facility];  Service:    • PB CHOLECYSTOENTEROSTOMY  1992   • PRIMARY C SECTION  1991       CURRENT MEDICATIONS  Current Outpatient Medications   Medication Sig Dispense Refill   • Rosuvastatin Calcium 40 MG CAPSULE SPRINKLE Take  by mouth.     • Calcium Carbonate (CALCIUM 500 PO) Take 1 Tab by mouth 2  "(two) times a day.     • Glatiramer Acetate (GLATOPA) 40 MG/ML Solution Prefilled Syringe Inject  under the skin.     • coenzyme Q-10 30 MG capsule Take 60 mg by mouth every day.     • Dalfampridine 10 MG TABLET SR 12 HR Take 10 mg by mouth 2 Times a Day. 180 Tab 3   • Cranberry 450 MG Cap Take  by mouth.     • methenamine hip (HIPPREX) 1 GM Tab Take 1 g by mouth 2 Times a Day.     • oxybutynin SR (DITROPAN-XL) 5 MG TB24 Take 5 mg by mouth every morning.     • predniSONE (DELTASONE) 10 MG TABS Take 7.5 mg by mouth every day. Take with food      • vitamin D (CHOLECALCIFEROL) 1000 UNIT TABS Take 5,000 Units by mouth every bedtime.       No current facility-administered medications for this visit.       ALLERGIES  Allergies   Allergen Reactions   • Sulfa Drugs Rash             PHYSICAL EXAM  VITAL SIGNS: /62 (BP Location: Right arm, Patient Position: Sitting, BP Cuff Size: Adult)   Pulse 98   Temp 36.1 °C (97 °F) (Temporal)   Ht 1.626 m (5' 4\")   Wt 70 kg (154 lb 5.2 oz)   SpO2 97%   BMI 26.49 kg/m²   Constitutional: Well developed, Well nourished, No acute distress, Non-toxic appearance.   HENT: Normocephalic atraumatic  Eyes: Fundi disc pale  Neck: Normal range of motion, No tenderness, Supple, No stridor.   Cardiovascular: Normal heart rate, Normal rhythm, No murmurs, No rubs, No gallops.   Thorax & Lungs: Normal breath sounds, No respiratory distress, No wheezing, No chest tenderness.   Abdomen: Bowel sounds normal, Soft, No tenderness, No masses, No pulsatile masses.   Skin: Warm, Dry, No erythema, No rash.   Back: No tenderness, No CVA tenderness.   Extremities: Intact distal pulses, No edema, No tenderness, No cyanosis, No clubbing.   Neurologic: Gait and station are normal say for a little bit of limping due   to her collateral ligament injury.  Romberg is negative.  Strength in the   upper and lower extremities is normal.  Plantar responses are equivocal.  She   can do finger-to-nose, heel-to-shin " well.  Fine finger movements are done   well.  She has a little sustained nystagmus on lateral gaze.  Pupils equal,   round and reactive to light.  Fundi appeared to be in normal.  Range of motion   of the neck is normal.  No facial weakness or facial sensory deficit.  Pupils   are reactive to light and equal.  Psychiatric: Affect normal, Judgment normal, Mood normal.   Lab: Reviewed with patient in detail and as noted in results.    EEG  No abnormality    RADIOLOGY/PROCEDURES  MRI reviewed with the patient in the PACS system       Narrative      Pre-and postcontrast examination of the brain  3/18/2014 8:25 AM    HISTORY/REASON FOR EXAM:  Multiple sclerosis.      TECHNIQUE/EXAM DESCRIPTION:   MRI of the brain without and with contrast.    T1 sagittal, T2 fast spin-echo axial, T1 coronal, FLAIR coronal, Diffusion weighted and Apparent Diffusion Coefficient (ADC map) axial images were obtained of the whole brain. T1 post-contrast axial and T1 post-contrast coronal images were obtained..    The study was performed on a [employer] Signa 1.5 Charis MRI scanner.    20 mL gadolinium contrast was administered intravenously.    COMPARISON:  6/11/00    FINDINGS:     There are multiple T2 hyperintense lesions in the subcortical and periventricular white matter, brainstem and middle cerebellar peduncles. None of the lesions demonstrates contrast enhancement. Most of the lesions are hypointense on T1-weighted sequences. There are no new lesions.    There is no acute infarct. There is no acute or chronic parenchymal hemorrhage. The ventricles, cortical sulci and the basal cisterns appear prominent consistent with mild cerebral and cerebellar atrophy.    The visualized flow voids of the cerebral vasculature appear normal.  There is no large lesion identified in the expected course of the intracranial portions of the cranial nerves.    The skull bones appear normal. The paranasal sinuses are clear. The extracranial soft tissue including  orbits appear grossly normal.   Impression       1.  Multifocal T2 hyperintense lesions in the subcortical and periventricular white matter, brainstem and the bilateral middle cerebellar peduncles consistent with chronic plaques of multiple sclerosis. None of the lesions demonstrates contrast enhancement. Most of the lesions are hypointense on T1-weighted sequences. There are no new lesions.  2.  Mild cerebral atrophy.             ASSESSMENT AND PLAN  1. MS (multiple sclerosis) ([facility])  We reviewed patient's most recent MRI scan of the brain and the cervical spine which did not show any change from previous as listed above.  Plan to continue Glatopa low she liked Copaxone better.  We did consider stopping therapy in the future.  We discussed cognitive concerns in addition to gait stability. We discussed healthy diet and gentle regular exercise program. Vitamin supplementation with vitamin D and disease prevention was covered with the patient.Pt will continue with exercise and diet. She and I discussed potential wheelchair or wheeled walker needs for the future and allowing her to participate in events that require longer distance walking and/or standing.  Patient is going to investigate which type of device she thinks would work best for her lifestyle get back to me if she needs a letter or prescription.  We will continue the dalfampridine and her 25 foot timed walk test is slightly improved at 6.84.  Patient did have recent metabolic profile for safety of dalfampridine.    2. Neuropathy  Pt with peripheral neuropathy idiopathic in addition to her MS which does cause discomfort with long periods on her feet. We also discussed supplements with b and D vitamins.    .    Follow-up in 3 months     I spent 42 minutes with this patient face to face, over fifty percent was spent counseling patient on their condition, best management practices, reviewing test results and risks and benefits of treatment.

## 2021-10-15 LAB
FOLATE SERPL-MCNC: 17.8 NG/ML
VIT B12 SERPL-MCNC: 568 PG/ML (ref 232–1245)

## 2021-11-03 DIAGNOSIS — G35 MULTIPLE SCLEROSIS (HCC): ICD-10-CM

## 2021-11-04 RX ORDER — GLATIRAMER ACETATE 40 MG/ML
INJECTION, SOLUTION SUBCUTANEOUS
Qty: 36 ML | Refills: 3 | Status: SHIPPED | OUTPATIENT
Start: 2021-11-04

## 2021-11-04 RX ORDER — DALFAMPRIDINE 10 MG/1
TABLET, FILM COATED, EXTENDED RELEASE ORAL
Qty: 180 TABLET | Refills: 3 | Status: SHIPPED | OUTPATIENT
Start: 2021-11-04

## 2021-12-07 ENCOUNTER — OFFICE VISIT (OUTPATIENT)
Dept: NEUROLOGY | Facility: MEDICAL CENTER | Age: 66
End: 2021-12-07
Attending: PSYCHIATRY & NEUROLOGY
Payer: MEDICARE

## 2021-12-07 VITALS
RESPIRATION RATE: 12 BRPM | OXYGEN SATURATION: 98 % | HEIGHT: 67 IN | TEMPERATURE: 97.7 F | HEART RATE: 78 BPM | DIASTOLIC BLOOD PRESSURE: 74 MMHG | SYSTOLIC BLOOD PRESSURE: 104 MMHG | BODY MASS INDEX: 24.74 KG/M2 | WEIGHT: 157.63 LBS

## 2021-12-07 DIAGNOSIS — G35 MULTIPLE SCLEROSIS (HCC): ICD-10-CM

## 2021-12-07 PROCEDURE — 99214 OFFICE O/P EST MOD 30 MIN: CPT | Performed by: PSYCHIATRY & NEUROLOGY

## 2021-12-07 PROCEDURE — 99212 OFFICE O/P EST SF 10 MIN: CPT | Performed by: PSYCHIATRY & NEUROLOGY

## 2021-12-07 RX ORDER — CYCLOSPORINE 0.5 MG/ML
EMULSION OPHTHALMIC
COMMUNITY
Start: 2021-11-02

## 2021-12-07 ASSESSMENT — FIBROSIS 4 INDEX: FIB4 SCORE: 1.13

## 2021-12-07 NOTE — PROGRESS NOTES
"Chief Complaint   Patient presents with   • Follow-Up     MS (multiple sclerosis) (Formerly [facility])       Problem List Items Addressed This Visit     MS (multiple sclerosis) (Formerly [facility])     Pt is looking to move to [facility] and she does not know when it will happen because of things with the house market. Pt is doing fine. Walking has been good and she has been looking at a lot of homes. Pt is having some issues with leg control and she feels like intermittently is hard to get her foot to the brain. Pt states that she has some memory issues and she writes things done to remember.         Relevant Orders    OT Driving Eval          History of present illness:  Bharti Zabala 66 y.o. female presents today for treatment of her multiple sclerosis with concerns about her driving.  Patient occasionally has cognitive concerns also but comes and goes.    Past medical history:   Past Medical History:   Diagnosis Date   • Anesthesia     \"During my  all my blood went to my feet, during spinal\"   • Bowel habit changes     constipation   • Diabetes (Formerly [facility]) 2017    Diet controlled   • High cholesterol    • MS (multiple sclerosis) (Formerly [facility])    • Pain 2017    left knee   • Urinary bladder disorder     \"Hx of ongoing bladder infections, none at the moment \"   • Urinary incontinence 2017    Pt uses straight cath bid, \"can't totally empty bladder due to MS\"       Past surgical history:   Past Surgical History:   Procedure Laterality Date   • KNEE UNICOMPARTMENTAL Left 2017    Procedure: KNEE UNICOMPARTMENTAL - MEDIAL [place];  Surgeon: Tim Horn M.D.;  Location: SURGERY [facility];  Service:    • PB CHOLECYSTOENTEROSTOMY     • PRIMARY C SECTION         Family history:   Family History   Problem Relation Age of Onset   • Cancer Other        Social history:   Social History     Socioeconomic History   • Marital status:      Spouse name: Not on file   • Number of children: Not on file   • Years of " education: Not on file   • Highest education level: Not on file   Occupational History   • Not on file   Tobacco Use   • Smoking status: Never Smoker   • Smokeless tobacco: Never Used   Vaping Use   • Vaping Use: Never used   Substance and Sexual Activity   • Alcohol use: No     Comment: rarely has a glass of wine   • Drug use: No   • Sexual activity: Not on file   Other Topics Concern   • Not on file   Social History Narrative   • Not on file     Social Determinants of Health     Financial Resource Strain:    • Difficulty of Paying Living Expenses: Not on file   Food Insecurity:    • Worried About Running Out of Food in the Last Year: Not on file   • Ran Out of Food in the Last Year: Not on file   Transportation Needs:    • Lack of Transportation (Medical): Not on file   • Lack of Transportation (Non-Medical): Not on file   Physical Activity:    • Days of Exercise per Week: Not on file   • Minutes of Exercise per Session: Not on file   Stress:    • Feeling of Stress : Not on file   Social Connections:    • Frequency of Communication with Friends and Family: Not on file   • Frequency of Social Gatherings with Friends and Family: Not on file   • Attends Latter day Services: Not on file   • Active Member of Clubs or Organizations: Not on file   • Attends Club or Organization Meetings: Not on file   • Marital Status: Not on file   Intimate Partner Violence:    • Fear of Current or Ex-Partner: Not on file   • Emotionally Abused: Not on file   • Physically Abused: Not on file   • Sexually Abused: Not on file   Housing Stability:    • Unable to Pay for Housing in the Last Year: Not on file   • Number of Places Lived in the Last Year: Not on file   • Unstable Housing in the Last Year: Not on file       Current medications:   Current Outpatient Medications   Medication   • RESTASIS 0.05 % ophthalmic emulsion   • Dalfampridine 10 MG TABLET SR 12 HR   • GLATOPA 40 MG/ML Solution Prefilled Syringe   • acyclovir (ZOVIRAX) 400 MG  tablet   • Rosuvastatin Calcium 40 MG CAPSULE SPRINKLE   • Calcium Carbonate (CALCIUM 500 PO)   • coenzyme Q-10 30 MG capsule   • Cranberry 450 MG Cap   • methenamine hip (HIPPREX) 1 GM Tab   • oxybutynin SR (DITROPAN-XL) 5 MG TB24   • predniSONE (DELTASONE) 10 MG TABS   • vitamin D (CHOLECALCIFEROL) 1000 UNIT TABS     No current facility-administered medications for this visit.       Medication Allergy:  Allergies   Allergen Reactions   • Sulfa Drugs Rash             Review of systems:   Constitutional: denies fever, night sweats, weight loss.   Eyes: denies acute vision change, eye pain or secretion.   Ears, Nose, Mouth, Throat: denies nasal secretion, nasal bleeding, difficulty swallowing, hearing loss, tinnitus, vertigo, ear pain, acute dental problems, oral ulcers or lesions.   Endocrine: denies recent weight changes, heat or cold intolerance, polyuria, polydypsia, polyphagia,abnormal hair growth.  Cardiovascular: denies new onset of chest pain, palpitations, syncope, or dyspnea of exertion.  Pulmonary: denies shortness of breath, new onset of cough, hemoptysis, wheezing, chest pain or flu-like symptoms.   GI: denies nausea, vomiting, diarrhea, GI bleeding, change in appetite, abdominal pain, and change in bowel habits.  : denies dysuria, urinary incontinence, hematuria.  Heme/oncology: denies history of easy bruising or bleeding. No history of cancer, DVTor PE.  Allergy/immunology: denies hives/urticaria, or itching.   Dermatologic: denies new rash, or new skin lesions.  Musculoskeletal:denies joint swelling or pain, muscle pain, neck and back pain. Neurologic: denies headaches, acute visual changes, facial droopiness, muscle weakness (focal or generalized), paresthesias, anesthesia, ataxia, change in speech or language, memory loss, abnormal movements, seizures, loss of consciousness, or episodes of confusion.   Psychiatric: denies symptoms of depression, anxiety, hallucinations, mood swings or changes,  "suicidal or homicidal thoughts.     Physical examination:   Vitals:    12/07/21 1030   BP: 104/74   BP Location: Left arm   Patient Position: Sitting   BP Cuff Size: Adult   Pulse: 78   Resp: 12   Temp: 36.5 °C (97.7 °F)   TempSrc: Temporal   SpO2: 98%   Weight: 71.5 kg (157 lb 10.1 oz)   Height: 1.702 m (5' 7\")     General: Patient in no acute distress, pleasant and cooperative.  HEENT: Normocephalic, no signs of acute trauma.   Neck: supple, no meningeal signs or carotid bruits. There is normal range of motion. No tenderness on exam.   Chest: clear to auscultation. No cough.   CV: RRR, no murmurs.   Skin: no signs of acute rashes or trauma.   Musculoskeletal: joints exhibit full range of motion, without any pain to palpation. There are no signs of joint or muscle swelling. There is no tenderness to deep palpation of muscles.   Psychiatric: No hallucinatory behavior. Denies symptoms of depression or suicidal ideation. Mood and affect appear normal on exam.     NEUROLOGICAL EXAM:   Mental status, orientation: Awake, alert and fully oriented.   Speech and language: speech is clear and fluent. The patient is able to name, repeat and comprehend.   Memory: There is intact recollection of recent and remote events.   Cranial nerve exam: Pupils are 3-4 mm bilaterally and equally reactive to light and accommodation. Visual fields are intact by confrontation. Fundoscopic exam was unremarkable. There is no nystagmus on primary or secondary gaze. Intact full EOM in all directions of gaze. Face appears symmetric. Sensation in the face is intact to light touch. Uvula is midline. Palate elevates symmetrically. Tongue is midline and without any signs of tongue biting or fasciculations. Sternocleidomastoid muscles exhibit is normal strength bilaterally. Shoulder shrug is intact bilaterally.   Motor exam: Strength is 5/5 in all extremities except 4 out of 5 in her right lower extremity.. Tone is normal. No abnormal movements were " seen on exam.   Sensory exam reveals normal sense of light touch, proprioception, vibration and pinprick in all extremities.   Deep tendon reflexes:  2+ throughout. Plantar responses are flexor. There is no clonus.   Coordination: shows a normal finger-nose-finger. Normal rapidly alternating movements.   Gait: The patient was able to get up from seated position on first attempt without requiring assistance. Found to be steady when walking. Movements were fluid with normal arm swing. The patient was able to turn without difficulties or tendency to fall. Romberg examination positive    25 FTW 7.85sec    ANCILLARY DATA REVIEWED:     Lab Data Review:  No results found for this or any previous visit (from the past 24 hour(s)).    Records reviewed: I reviewed previous laboratory testing in [facility].      Imaging: Reviewed previous imaging from July 2020 one of the brain and the cervical spine which did not show any abnormalities from her previous scans of 2018.  Patient's MS has been stable radiologically.          ASSESSMENT AND PLAN:    1. MS (multiple sclerosis) (HCC)  Plan to continue with the Glatopa and dalfampridine. Refer for the walk aide/bioness. Pt states she would like to do a driving evaluation  - OT Driving Eval        FOLLOW-UP:   3 months    My total time spent caring for the patient on the day of the encounter was 31 minutes.   This does not include time spent on separately billable procedures/tests.      EDUCATION AND COUNSELING:  -Discussed regular exercise program and prevention of cardiovascular disease, including stroke.   -Discussed healthy lifestyle, including: healthy diet (rich in fruits, vegetables, nuts and healthy oils); proper hydration, and adequate sleep hygiene (allowing 7-8 hrs of overnight sleep).        Melissa Bloch, MD  Clinical  of Neurology [facility].   Diplomate in Neurology.   Office: [phone]  Fax: [phone]

## 2021-12-07 NOTE — ASSESSMENT & PLAN NOTE
Pt is looking to move to [facility] and she does not know when it will happen because of things with the house market. Pt is doing fine. Walking has been good and she has been looking at a lot of homes. Pt is having some issues with leg control and she feels like intermittently is hard to get her foot to the brain. Pt states that she has some memory issues and she writes things done to remember.

## 2022-01-10 ENCOUNTER — OUTPATIENT INFUSION SERVICES (OUTPATIENT)
Dept: ONCOLOGY | Facility: MEDICAL CENTER | Age: 67
End: 2022-01-10
Attending: FAMILY MEDICINE
Payer: MEDICARE

## 2022-01-10 VITALS
HEIGHT: 66 IN | TEMPERATURE: 97 F | SYSTOLIC BLOOD PRESSURE: 115 MMHG | RESPIRATION RATE: 18 BRPM | DIASTOLIC BLOOD PRESSURE: 67 MMHG | BODY MASS INDEX: 25.33 KG/M2 | WEIGHT: 157.63 LBS | OXYGEN SATURATION: 96 % | HEART RATE: 77 BPM

## 2022-01-10 DIAGNOSIS — M81.0 OSTEOPOROSIS, UNSPECIFIED OSTEOPOROSIS TYPE, UNSPECIFIED PATHOLOGICAL FRACTURE PRESENCE: ICD-10-CM

## 2022-01-10 LAB
CA-I BLD ISE-SCNC: 1.21 MMOL/L (ref 1.1–1.3)
CREAT BLD-MCNC: 0.7 MG/DL (ref 0.5–1.4)

## 2022-01-10 PROCEDURE — 36415 COLL VENOUS BLD VENIPUNCTURE: CPT

## 2022-01-10 PROCEDURE — 96372 THER/PROPH/DIAG INJ SC/IM: CPT

## 2022-01-10 PROCEDURE — 82565 ASSAY OF CREATININE: CPT

## 2022-01-10 PROCEDURE — 700111 HCHG RX REV CODE 636 W/ 250 OVERRIDE (IP): Mod: JG | Performed by: FAMILY MEDICINE

## 2022-01-10 PROCEDURE — 82330 ASSAY OF CALCIUM: CPT

## 2022-01-10 RX ORDER — ROSUVASTATIN CALCIUM 40 MG/1
40 TABLET, COATED ORAL
COMMUNITY
Start: 2021-12-02

## 2022-01-10 RX ADMIN — DENOSUMAB 60 MG: 60 INJECTION SUBCUTANEOUS at 09:52

## 2022-01-10 ASSESSMENT — FIBROSIS 4 INDEX: FIB4 SCORE: 1.13

## 2022-01-10 NOTE — PROGRESS NOTES
Bharti arrives to [facility] for q 6 month Prolia for osteoporosis. Last tx was 7/9/21. Patient voices no new health concerns. Advised patient to speak to her provider regarding an updated DEXA scan. Denies active infections; denies s/s hypocalcemia; denies recent/upcoming invasive dental procedures. Confirms taking supplementary PO calcium and Vitamin D. ISTATs collected: serum creatinine = 0.7; ionized calcium = 1.21. Patient meets parameters to receive injection today. Prolia administered SQ to back of left arm without issues. Emailed scheduling regarding next appt. Discharged home to self care in no apparent distress.

## 2022-01-24 ENCOUNTER — TELEPHONE (OUTPATIENT)
Dept: NEUROLOGY | Facility: MEDICAL CENTER | Age: 67
End: 2022-01-24

## 2022-01-24 DIAGNOSIS — G35 MULTIPLE SCLEROSIS (HCC): ICD-10-CM

## 2022-02-16 ENCOUNTER — OCCUPATIONAL THERAPY (OUTPATIENT)
Dept: OCCUPATIONAL THERAPY | Facility: REHABILITATION | Age: 67
End: 2022-02-16
Attending: PSYCHIATRY & NEUROLOGY
Payer: MEDICARE

## 2022-02-16 DIAGNOSIS — G35 MULTIPLE SCLEROSIS (HCC): ICD-10-CM

## 2022-02-16 PROCEDURE — 97750 PHYSICAL PERFORMANCE TEST: CPT

## 2022-02-16 ASSESSMENT — BALANCE ASSESSMENTS
BALANCE - STANDING DYNAMIC: NORMAL
BALANCE - SITTING DYNAMIC: NORMAL
BALANCE - STANDING STATIC: NORMAL
BALANCE - SITTING STATIC: NORMAL
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
## 2022-03-07 ENCOUNTER — OFFICE VISIT (OUTPATIENT)
Dept: NEUROLOGY | Facility: MEDICAL CENTER | Age: 67
End: 2022-03-07
Attending: PSYCHIATRY & NEUROLOGY
Payer: MEDICARE

## 2022-03-07 VITALS
HEART RATE: 78 BPM | BODY MASS INDEX: 25.4 KG/M2 | DIASTOLIC BLOOD PRESSURE: 68 MMHG | HEIGHT: 66 IN | TEMPERATURE: 98 F | SYSTOLIC BLOOD PRESSURE: 116 MMHG | OXYGEN SATURATION: 96 % | WEIGHT: 158.07 LBS

## 2022-03-07 DIAGNOSIS — G35 MULTIPLE SCLEROSIS (HCC): ICD-10-CM

## 2022-03-07 DIAGNOSIS — H33.21 RIGHT RETINAL DETACHMENT: ICD-10-CM

## 2022-03-07 DIAGNOSIS — E25.9 ADRENOGENITAL DISORDER (HCC): ICD-10-CM

## 2022-03-07 PROCEDURE — 99212 OFFICE O/P EST SF 10 MIN: CPT | Performed by: PSYCHIATRY & NEUROLOGY

## 2022-03-07 PROCEDURE — 99215 OFFICE O/P EST HI 40 MIN: CPT | Performed by: PSYCHIATRY & NEUROLOGY

## 2022-03-07 RX ORDER — LOTEPREDNOL ETABONATE 2.5 MG/ML
SUSPENSION/ DROPS OPHTHALMIC
COMMUNITY

## 2022-03-07 ASSESSMENT — PATIENT HEALTH QUESTIONNAIRE - PHQ9: CLINICAL INTERPRETATION OF PHQ2 SCORE: 0

## 2022-03-07 ASSESSMENT — FIBROSIS 4 INDEX: FIB4 SCORE: 1.15

## 2022-03-07 NOTE — ASSESSMENT & PLAN NOTE
Pt is moving to [facility] and has questions about establishing with new neurologist in the [place] area. Patient states she has been very busy with moving, going through her things and boxing some things up. She is looking for a 1 level home for her next home. She has family in [facility] and thinks that the cooler climate will be helpful for her MS. Pt feels like she has about 20 minutes of ability to walk without her foot drop getting worse and causing her to fall. Pt states that she feels her AFO makes her walking worse. Patient is diligent in trying to keep her strength up on her right side which is weaker.

## 2022-03-08 NOTE — ASSESSMENT & PLAN NOTE
Patient sees endocrinology and wrote will reestablish when she moves to [facility]. She is chronically on an insulin daily currently at a dose of 7.5 mg.

## 2022-03-08 NOTE — PROGRESS NOTES
"Chief Complaint   Patient presents with   • Follow-Up     MS       Problem List Items Addressed This Visit     MS (multiple sclerosis) ([facility])     Pt is moving to [facility] and has questions about establishing with new neurologist in the [place] area. Patient states she has been very busy with moving, going through her things and boxing some things up. She is looking for a 1 level home for her next home. She has family in [facility] and thinks that the cooler climate will be helpful for her MS. Pt feels like she has about 20 minutes of ability to walk without her foot drop getting worse and causing her to fall. Pt states that she feels her AFO makes her walking worse. Patient is diligent in trying to keep her strength up on her right side which is weaker.         Right retinal detachment     This has been repaired and patient feels like her vision has been stable.         Adrenogenital disorder ([facility])     Patient sees endocrinology and wrote will reestablish when she moves to [facility]. She is chronically on an insulin daily currently at a dose of 7.5 mg.               History of present illness:  Bharti Zabala 67 y.o. female presents today for treatment of her multiple sclerosis. Patient was diagnosed in  when she had symptoms of numbness on her left arm and leg. Patient had a lumbar puncture by her neurologist at that time Dr. Les Santos and was positive for unique oligoclonal bands. She has been on Glatopa.    Past medical history:   Past Medical History:   Diagnosis Date   • Anesthesia     \"During my  all my blood went to my feet, during spinal\"   • Bowel habit changes     constipation   • Diabetes ([facility]) 2017    Diet controlled   • High cholesterol    • MS (multiple sclerosis) ([facility])    • Pain 2017    left knee   • Urinary bladder disorder     \"Hx of ongoing bladder infections, none at the moment \"   • Urinary incontinence 2017    Pt uses straight cath bid, \"can't " "totally empty bladder due to MS\"       Past surgical history:   Past Surgical History:   Procedure Laterality Date   • KNEE UNICOMPARTMENTAL Left 5/23/2017    Procedure: KNEE UNICOMPARTMENTAL - MEDIAL [place];  Surgeon: Tim Horn M.D.;  Location: SURGERY [facility];  Service:    • WI CHOLECYSTOENTEROSTOMY  1992   • PRIMARY C SECTION  1991       Family history:   Family History   Problem Relation Age of Onset   • Cancer Other        Social history:   Social History     Socioeconomic History   • Marital status:      Spouse name: Not on file   • Number of children: Not on file   • Years of education: Not on file   • Highest education level: Not on file   Occupational History   • Not on file   Tobacco Use   • Smoking status: Never Smoker   • Smokeless tobacco: Never Used   Vaping Use   • Vaping Use: Never used   Substance and Sexual Activity   • Alcohol use: No     Comment: rarely has a glass of wine   • Drug use: No   • Sexual activity: Not on file   Other Topics Concern   • Not on file   Social History Narrative   • Not on file     Social Determinants of Health     Financial Resource Strain: Not on file   Food Insecurity: Not on file   Transportation Needs: Not on file   Physical Activity: Not on file   Stress: Not on file   Social Connections: Not on file   Intimate Partner Violence: Not on file   Housing Stability: Not on file       Current medications:   Current Outpatient Medications   Medication   • Loteprednol Etabonate (EYSUVIS) 0.25 % Suspension   • rosuvastatin (CRESTOR) 40 MG tablet   • RESTASIS 0.05 % ophthalmic emulsion   • Dalfampridine 10 MG TABLET SR 12 HR   • GLATOPA 40 MG/ML Solution Prefilled Syringe   • acyclovir (ZOVIRAX) 400 MG tablet   • Calcium Carbonate (CALCIUM 500 PO)   • coenzyme Q-10 30 MG capsule   • Cranberry 450 MG Cap   • methenamine hip (HIPPREX) 1 GM Tab   • oxybutynin SR (DITROPAN-XL) 5 MG TB24   • predniSONE (DELTASONE) 10 MG TABS   • vitamin D (CHOLECALCIFEROL) " "1000 UNIT TABS     No current facility-administered medications for this visit.       Medication Allergy:  Allergies   Allergen Reactions   • Sulfa Drugs Rash             Review of systems:   Constitutional: denies fever, night sweats, weight loss.   Eyes: denies acute vision change, eye pain or secretion.   Ears, Nose, Mouth, Throat: denies nasal secretion, nasal bleeding, difficulty swallowing, hearing loss, tinnitus, vertigo, ear pain, acute dental problems, oral ulcers or lesions.   Endocrine: denies recent weight changes, heat or cold intolerance, polyuria, polydypsia, polyphagia,abnormal hair growth.  Cardiovascular: denies new onset of chest pain, palpitations, syncope, or dyspnea of exertion.  Pulmonary: denies shortness of breath, new onset of cough, hemoptysis, wheezing, chest pain or flu-like symptoms.   GI: denies nausea, vomiting, diarrhea, GI bleeding, change in appetite, abdominal pain, and change in bowel habits.  : denies dysuria, urinary incontinence, hematuria.  Heme/oncology: denies history of easy bruising or bleeding. No history of cancer, DVTor PE.  Allergy/immunology: denies hives/urticaria, or itching.   Dermatologic: denies new rash, or new skin lesions.  Musculoskeletal:denies joint swelling or pain, muscle pain, neck and back pain. Neurologic: denies headaches, acute visual changes, facial droopiness, muscle weakness (focal or generalized), paresthesias, anesthesia, ataxia, change in speech or language, memory loss, abnormal movements, seizures, loss of consciousness, or episodes of confusion.   Psychiatric: denies symptoms of depression, anxiety, hallucinations, mood swings or changes, suicidal or homicidal thoughts.     Physical examination:   Vitals:    03/07/22 0928   BP: 116/68   Pulse: 78   Temp: 36.7 °C (98 °F)   SpO2: 96%   Weight: 71.7 kg (158 lb 1.1 oz)   Height: 1.676 m (5' 6\")     General: Patient in no acute distress, pleasant and cooperative.  HEENT: Normocephalic, no " signs of acute trauma.   Neck: supple, no meningeal signs or carotid bruits. There is normal range of motion. No tenderness on exam.   Chest: clear to auscultation. No cough.   CV: RRR, no murmurs.   Skin: no signs of acute rashes or trauma.   Musculoskeletal: joints exhibit full range of motion, without any pain to palpation. There are no signs of joint or muscle swelling. There is no tenderness to deep palpation of muscles.   Psychiatric: No hallucinatory behavior. Denies symptoms of depression or suicidal ideation. Mood and affect appear normal on exam.     NEUROLOGICAL EXAM:   Mental status, orientation: Awake, alert and fully oriented.   Speech and language: speech is clear and fluent. The patient is able to name, repeat and comprehend.   Memory: There is intact recollection of recent and remote events.   Cranial nerve exam: Pupils are 3-4 mm bilaterally and equally reactive to light and accommodation. Visual fields are intact by confrontation. Fundoscopic exam was unremarkable. There is no nystagmus on primary or secondary gaze. Intact full EOM in all directions of gaze. Face appears symmetric. Sensation in the face is intact to light touch. Uvula is midline. Palate elevates symmetrically. Tongue is midline and without any signs of tongue biting or fasciculations. Sternocleidomastoid muscles exhibit is normal strength bilaterally. Shoulder shrug is intact bilaterally.   Motor exam: Strength is 4/5 in all extremities on her right side and 4+ out of 5 on her left.. Tone is creased on her right side. No abnormal movements were seen on exam.   Sensory exam reveals abnormal sense of light touch, proprioception, vibration and pinprick on her left compared to her right side.   Deep tendon reflexes:  3+ throughout. Plantar responses are flexor. There is no clonus.   Coordination: shows a normal finger-nose-finger. Normal rapidly alternating movements.   Gait: The patient was able to get up from seated position on  first attempt without requiring assistance. Found to be slightly unsteady when walking. Movements were fluid with normal arm swing. The patient was able to turn without difficulties or tendency to fall. Romberg examination +      ANCILLARY DATA REVIEWED:     Lab Data Review:  No results found for this or any previous visit (from the past 24 hour(s)).    Records reviewed: Reviewed patient's most recent labs and also I have reviewed medical records from other providers      Imaging: I have reviewed the following images which are her most recent MRI of the brain and cervical spine from July 2021 and agree with the radiologic interpretations.       Narrative & Impression     7/6/2021 9:53 AM     HISTORY/REASON FOR EXAM:  Multiple sclerosis, monitor.        TECHNIQUE/EXAM DESCRIPTION:   MRI of the brain without and with contrast.     T1 sagittal, T2 fast spin-echo axial, T1 coronal, FLAIR coronal, diffusion-weighted and apparent diffusion coefficient (ADC map) axial images were obtained of the whole brain. T1 postcontrast axial and T1 postcontrast coronal images were obtained.     The study was performed on a Figure 1 Signa 1.5 Charis MRI scanner.     15 mL ProHance contrast was administered intravenously.     COMPARISON:  None.     FINDINGS:     T2 hyperintense lesions involving the anterior right medulla, left shira, are stable. Overall stable appearance of multiple T2 lesions involving the bilateral periventricular and deep white matter as well as the frontoparietal subcortical region involving   both cerebral hemispheres. The postcontrast images do not demonstrate any abnormal enhancement to suggest an active lesion.     Included portions of orbits, paranasal sinuses, mastoids are within normal limits.     Intracranial flow voids are normal.     Diffusion-weighted images are normal.     Gradient echo images do not demonstrate any evidence of intracranial hemorrhage.     Post contrast images do not demonstrate any abnormal  intracranial enhancement.     IMPRESSION:        Stable appearance of multiple supratentorial and infratentorial white matter lesions in a distribution and morphology compatible with the diagnosis of a demyelinating process such as multiple sclerosis.     No abnormal enhancement is identified in any of these lesions to suggest active demyelination.          MR-CERVICAL SPINE-W/O  Order: [id number]   Status: Final result       Visible to patient: Yes (seen)       Next appt: 07/11/2022 at 09:30 AM in Oncology ([facility])       Dx: Multiple sclerosis ([facility])       0 Result Notes      Details    Reading Physician Reading Date Result Priority   Nel Duarte M.D.  [phone] 7/6/2021 Routine     Narrative & Impression     7/6/2021 9:53 AM     HISTORY/REASON FOR EXAM:  Multiple sclerosis, monitor.        TECHNIQUE/EXAM DESCRIPTION:  MRI of the cervical spine without contrast.     The study was performed on a Hinge Signa 1.5 Charis MRI scanner. T1 sagittal, T2 fast spin-echo sagittal, and gradient-echo axial images were obtained of the cervical spine.     COMPARISON: 7/26/2018     FINDINGS:  Vertebral body height and anatomic alignment are maintained. Bone marrow signal is normal. There is minimal disc desiccation. Prevertebral soft tissues are unremarkable.     Multiple scattered high T2 signal lesions are again noted throughout the cervical spine. Caliber remains normal.     Paravertebral soft tissues are unremarkable.     Findings at specific levels:     C2-3:  No significant central canal or neural foraminal narrowing.     C3-4:  Moderate facet arthropathy with mild left foraminal stenosis. No central canal narrowing.     C4-5:  Posterior spondylitic ridging and disc bulging with mild uncovertebral joint hypertrophy and facet arthropathy. There is mild bilateral foraminal narrowing, right greater than left.     C5-6:  Posterior spondylitic ridging and disc bulging with mild uncovertebral joint hypertrophy.  No significant central canal or foraminal narrowing.     C6-7:  Posterior spondylitic ridging and disc bulging with mild to withdraw hypertrophy. Mild bilateral foraminal narrowing. No significant central canal narrowing.     C7-T1:  No significant central canal or neural foraminal narrowing.        IMPRESSION:     1.  Multiple innumerable high T2 signal foci scattered throughout the cervical cord are similar to the prior exam and consistent with history of multiple sclerosis.     2.  Multilevel degenerative disc disease and facet arthropathy as described. Findings are similar to the prior exam.             ASSESSMENT AND PLAN:    1. MS (multiple sclerosis) (formerly [facility])  Plan to continue Glatopa and patient will reestablish with the MS center in the [facility] area. We discussed [facility] as a good option but she can confer with her new primary care for recommendations also. I will refill her Glatopa until that point. We discussed considering when she moves to [facility] either the WalkAide or the Bioness as devises which may help with her foot drop and give her more power for using her right leg longer while walking. She was given information about these devices. Discussed the importance of continued healthy diet which patient has been diligent with and also continued home physical therapy and exercise program.    2. Right retinal detachment  Patient is stable and will reestablish with ophthalmology in [facility].        FOLLOW-UP:   No follow-ups on file.      My total time spent caring for the patient on the day of the encounter was 41 minutes.   This does not include time spent on separately billable procedures/tests.    EDUCATION AND COUNSELING:  -Discussed regular exercise program and prevention of cardiovascular disease, including stroke.   -Discussed healthy lifestyle, including: healthy diet (rich in fruits, vegetables, nuts and healthy oils); proper hydration, and adequate sleep  hygiene (allowing 7-8 hrs of overnight sleep).        Melissa Bloch, MD  Clinical  of Neurology [facility].   Diplomate in Neurology.   Office: [phone]  Fax: [phone]

## 2022-06-17 DIAGNOSIS — G35 MULTIPLE SCLEROSIS (HCC): ICD-10-CM

## 2022-07-09 ENCOUNTER — TELEPHONE (OUTPATIENT)
Dept: ONCOLOGY | Facility: MEDICAL CENTER | Age: 67
End: 2022-07-09
Payer: MEDICARE

## 2022-07-09 NOTE — TELEPHONE ENCOUNTER
Called pt and left a vm to let her know that we would need to cx her appt on Monday 07/11/22 for her prolia injection as we didn't receive a new order from her provider. I asked her to follow up with her provider as well and once we receive the new order that we will get her rescheduled asap.

## 2022-07-11 ENCOUNTER — APPOINTMENT (OUTPATIENT)
Dept: ONCOLOGY | Facility: MEDICAL CENTER | Age: 67
End: 2022-07-11
Attending: FAMILY MEDICINE
Payer: MEDICARE

## (undated) DEVICE — SODIUM CHL. IRRIGATION 0.9% 3000ML (4EA/CA 65CA/PF)

## (undated) DEVICE — GOWN SURGICAL X-LARGE ULTRA - FILM-REINFORCED (20/CA)

## (undated) DEVICE — TIP INTPLS HFLO ML ORFC BTRY - (12/CS)  FOR SURGILAV

## (undated) DEVICE — LENS/HOOD FOR SPACESUIT - (32/PK) PEEL AWAY FACE

## (undated) DEVICE — PACK TOTAL KNEE  (1/CA)

## (undated) DEVICE — PROTECTOR ULNA NERVE - (36PR/CA)

## (undated) DEVICE — STOCKINET TUBULAR 6IN STERILE - 6 X 48YDS (25/CA)

## (undated) DEVICE — WATER IRRIGATION STERILE 1000ML (12EA/CA)

## (undated) DEVICE — KIT ANESTHESIA W/CIRCUIT & 3/LT BAG W/FILTER (20EA/CA)

## (undated) DEVICE — STOCKINET BIAS 6 IN STERILE - (20/CA)

## (undated) DEVICE — SUTURE 3-0 ETHILON FSLX 30 (36PK/BX)"

## (undated) DEVICE — DRESSING ABDOMINAL PAD STERILE 8 X 10" (360EA/CA)"

## (undated) DEVICE — GLOVE, LITE (PAIR)

## (undated) DEVICE — NEEDLE NON SAFETY HYPO 22 GA X 1 1/2 IN (100/BX)

## (undated) DEVICE — BLADE SAGITTAL SAW 90 X 12.5 X .89MM

## (undated) DEVICE — LACTATED RINGERS INJ 1000 ML - (14EA/CA 60CA/PF)

## (undated) DEVICE — GLOVE BIOGEL INDICATOR SZ 8 SURGICAL PF LTX - (50/BX 4BX/CA)

## (undated) DEVICE — MASK ANESTHESIA ADULT  - (100/CA)

## (undated) DEVICE — GLOVE BIOGEL SZ 7.5 SURGICAL PF LTX - (50PR/BX 4BX/CA)

## (undated) DEVICE — RASP SMALL TEAR CROSS-CUT 5X11MM

## (undated) DEVICE — TOURNIQUET CUFF 34 X 4 ONE PORT DISP - STERILE (10/BX)

## (undated) DEVICE — TUBE CONNECTING SUCTION - CLEAR PLASTIC STERILE 72 IN (50EA/CA)

## (undated) DEVICE — SUTURE 3-0 VICRYL PLUS CT-1 - 8 X 18 INCH (12/BX)

## (undated) DEVICE — HANDPIECE 10FT INTPLS SCT PLS IRRIGATION HAND CONTROL SET (6/PK)

## (undated) DEVICE — DRAPE ARTHROSCOPE (ACL) - (10/CA)

## (undated) DEVICE — Device

## (undated) DEVICE — BLADE RECIP 77.5 X 11.2 X .76MM (1/EA)

## (undated) DEVICE — SUCTION INSTRUMENT YANKAUER BULBOUS TIP W/O VENT (50EA/CA)

## (undated) DEVICE — CANISTER SUCTION RIGID RED 1500CC (40EA/CA)

## (undated) DEVICE — BLOCK

## (undated) DEVICE — HEAD HOLDER JUNIOR/ADULT

## (undated) DEVICE — KIT EVACUATER 3 SPRING PVC LF 1/8 DRAIN SIZE (10EA/CA)"

## (undated) DEVICE — SUTURE GENERAL

## (undated) DEVICE — NEPTUNE 4 PORT MANIFOLD - (20/PK)

## (undated) DEVICE — ELECTRODE 850 FOAM ADHESIVE - HYDROGEL RADIOTRNSPRNT (50/PK)

## (undated) DEVICE — ELECTRODE DUAL RETURN W/ CORD - (50/PK)

## (undated) DEVICE — SENSOR SPO2 NEO LNCS ADHESIVE (20/BX) SEE USER NOTES

## (undated) DEVICE — DRAPE LARGE 3 QUARTER - (20/CA)

## (undated) DEVICE — TRAY SRGPRP PVP IOD WT PRP - (20/CA)

## (undated) DEVICE — GOWN WARMING STANDARD FLEX - (30/CA)

## (undated) DEVICE — NEEDLE SPINAL 22 GA X 3-1/2 - (25/BX)

## (undated) DEVICE — SODIUM CHL IRRIGATION 0.9% 1000ML (12EA/CA)

## (undated) DEVICE — IMMOBILIZER KNEE 20 INCH - (1/EA)

## (undated) DEVICE — KIT ROOM DECONTAMINATION

## (undated) DEVICE — MIXER BONE CEMENT REVOLUTION - W/FEMORAL PRESSURIZER (6/CA)